# Patient Record
Sex: MALE | Race: WHITE | NOT HISPANIC OR LATINO | Employment: FULL TIME | URBAN - METROPOLITAN AREA
[De-identification: names, ages, dates, MRNs, and addresses within clinical notes are randomized per-mention and may not be internally consistent; named-entity substitution may affect disease eponyms.]

---

## 2020-07-01 ENCOUNTER — PREP FOR SURGERY (OUTPATIENT)
Dept: OTHER | Facility: HOSPITAL | Age: 50
End: 2020-07-01

## 2020-07-01 DIAGNOSIS — Z12.11 ENCOUNTER FOR SCREENING FOR MALIGNANT NEOPLASM OF COLON: Primary | ICD-10-CM

## 2020-07-01 RX ORDER — SODIUM CHLORIDE, SODIUM LACTATE, POTASSIUM CHLORIDE, CALCIUM CHLORIDE 600; 310; 30; 20 MG/100ML; MG/100ML; MG/100ML; MG/100ML
30 INJECTION, SOLUTION INTRAVENOUS CONTINUOUS
Status: CANCELLED | OUTPATIENT
Start: 2020-08-03

## 2020-07-06 PROBLEM — Z12.11 ENCOUNTER FOR SCREENING FOR MALIGNANT NEOPLASM OF COLON: Status: ACTIVE | Noted: 2020-07-06

## 2020-07-27 ENCOUNTER — TRANSCRIBE ORDERS (OUTPATIENT)
Dept: SLEEP MEDICINE | Facility: HOSPITAL | Age: 50
End: 2020-07-27

## 2020-07-27 DIAGNOSIS — Z01.818 OTHER SPECIFIED PRE-OPERATIVE EXAMINATION: Primary | ICD-10-CM

## 2020-07-31 ENCOUNTER — LAB (OUTPATIENT)
Dept: LAB | Facility: HOSPITAL | Age: 50
End: 2020-07-31

## 2020-07-31 DIAGNOSIS — Z01.818 OTHER SPECIFIED PRE-OPERATIVE EXAMINATION: ICD-10-CM

## 2020-07-31 PROCEDURE — U0004 COV-19 TEST NON-CDC HGH THRU: HCPCS

## 2020-07-31 PROCEDURE — C9803 HOPD COVID-19 SPEC COLLECT: HCPCS

## 2020-08-01 LAB
REF LAB TEST METHOD: NORMAL
SARS-COV-2 RNA RESP QL NAA+PROBE: NOT DETECTED

## 2020-08-03 ENCOUNTER — ANESTHESIA EVENT (OUTPATIENT)
Dept: GASTROENTEROLOGY | Facility: HOSPITAL | Age: 50
End: 2020-08-03

## 2020-08-03 ENCOUNTER — ANESTHESIA (OUTPATIENT)
Dept: GASTROENTEROLOGY | Facility: HOSPITAL | Age: 50
End: 2020-08-03

## 2020-09-10 ENCOUNTER — TRANSCRIBE ORDERS (OUTPATIENT)
Dept: ADMINISTRATIVE | Facility: HOSPITAL | Age: 50
End: 2020-09-10

## 2020-09-10 DIAGNOSIS — Z01.818 OTHER SPECIFIED PRE-OPERATIVE EXAMINATION: Primary | ICD-10-CM

## 2020-09-18 ENCOUNTER — LAB (OUTPATIENT)
Dept: LAB | Facility: HOSPITAL | Age: 50
End: 2020-09-18

## 2020-09-18 DIAGNOSIS — Z01.818 OTHER SPECIFIED PRE-OPERATIVE EXAMINATION: ICD-10-CM

## 2020-09-18 PROCEDURE — U0004 COV-19 TEST NON-CDC HGH THRU: HCPCS

## 2020-09-18 PROCEDURE — C9803 HOPD COVID-19 SPEC COLLECT: HCPCS

## 2020-09-19 LAB — SARS-COV-2 RNA RESP QL NAA+PROBE: NOT DETECTED

## 2020-09-21 ENCOUNTER — HOSPITAL ENCOUNTER (OUTPATIENT)
Facility: HOSPITAL | Age: 50
Setting detail: HOSPITAL OUTPATIENT SURGERY
Discharge: HOME | End: 2020-09-21
Attending: INTERNAL MEDICINE | Admitting: INTERNAL MEDICINE

## 2020-09-21 VITALS
DIASTOLIC BLOOD PRESSURE: 80 MMHG | TEMPERATURE: 97.9 F | BODY MASS INDEX: 23.68 KG/M2 | RESPIRATION RATE: 16 BRPM | HEIGHT: 71 IN | HEART RATE: 58 BPM | OXYGEN SATURATION: 98 % | SYSTOLIC BLOOD PRESSURE: 105 MMHG | WEIGHT: 169.13 LBS

## 2020-09-21 DIAGNOSIS — Z12.11 ENCOUNTER FOR SCREENING FOR MALIGNANT NEOPLASM OF COLON: ICD-10-CM

## 2020-09-21 PROCEDURE — 45385 COLONOSCOPY W/LESION REMOVAL: CPT | Performed by: INTERNAL MEDICINE

## 2020-09-21 PROCEDURE — 25010000002 PROPOFOL 10 MG/ML EMULSION: Performed by: NURSE ANESTHETIST, CERTIFIED REGISTERED

## 2020-09-21 PROCEDURE — 88305 TISSUE EXAM BY PATHOLOGIST: CPT | Performed by: INTERNAL MEDICINE

## 2020-09-21 RX ORDER — SODIUM CHLORIDE, SODIUM LACTATE, POTASSIUM CHLORIDE, CALCIUM CHLORIDE 600; 310; 30; 20 MG/100ML; MG/100ML; MG/100ML; MG/100ML
30 INJECTION, SOLUTION INTRAVENOUS CONTINUOUS
Status: DISCONTINUED | OUTPATIENT
Start: 2020-09-21 | End: 2020-09-21 | Stop reason: HOSPADM

## 2020-09-21 RX ORDER — PROPOFOL 10 MG/ML
VIAL (ML) INTRAVENOUS AS NEEDED
Status: DISCONTINUED | OUTPATIENT
Start: 2020-09-21 | End: 2020-09-21 | Stop reason: SURG

## 2020-09-21 RX ORDER — PAROXETINE HYDROCHLORIDE 20 MG/1
20 TABLET, FILM COATED ORAL EVERY MORNING
COMMUNITY

## 2020-09-21 RX ORDER — LIDOCAINE HYDROCHLORIDE 20 MG/ML
INJECTION, SOLUTION INFILTRATION; PERINEURAL AS NEEDED
Status: DISCONTINUED | OUTPATIENT
Start: 2020-09-21 | End: 2020-09-21 | Stop reason: SURG

## 2020-09-21 RX ORDER — PROPOFOL 10 MG/ML
VIAL (ML) INTRAVENOUS CONTINUOUS PRN
Status: DISCONTINUED | OUTPATIENT
Start: 2020-09-21 | End: 2020-09-21 | Stop reason: SURG

## 2020-09-21 RX ADMIN — SODIUM CHLORIDE, POTASSIUM CHLORIDE, SODIUM LACTATE AND CALCIUM CHLORIDE: 600; 310; 30; 20 INJECTION, SOLUTION INTRAVENOUS at 07:56

## 2020-09-21 RX ADMIN — LIDOCAINE HYDROCHLORIDE 60 MG: 20 INJECTION, SOLUTION INFILTRATION; PERINEURAL at 08:02

## 2020-09-21 RX ADMIN — PROPOFOL 100 MG: 10 INJECTION, EMULSION INTRAVENOUS at 08:04

## 2020-09-21 RX ADMIN — PROPOFOL 160 MCG/KG/MIN: 10 INJECTION, EMULSION INTRAVENOUS at 08:05

## 2020-09-21 NOTE — H&P
"[facility], [place], operated by [facility]  Pre Procedure History & Physical    Chief Complaint:   Time for my colonoscopy    Subjective     HPI:   50 y.o. male presenting for average risk screening.  No prior cscope.  No current GI issues.  No family hx of colon cancer or polyps.        Past Medical History:   Past Medical History:   Diagnosis Date   • Anxiety    • Depression        Family History:  History reviewed. No pertinent family history.    Social History:       Medications:   Medications Prior to Admission   Medication Sig Dispense Refill Last Dose   • PARoxetine (PAXIL) 20 MG tablet Take 20 mg by mouth Every Morning.          Allergies:  Penicillins    ROS:    Pertinent items are noted in HPI     Objective     Blood pressure 119/81, pulse 78, temperature 97.9 °F (36.6 °C), resp. rate 20, height 180.3 cm (71\"), weight 76.7 kg (169 lb 2 oz), SpO2 98 %.    Physical Exam   Constitutional: Pt is oriented to person, place, and time and well-developed, well-nourished, and in no distress.   HENT:   Mouth/Throat: Oropharynx is clear and moist.   Neck: Normal range of motion. Neck supple.   Cardiovascular: Normal rate, regular rhythm and normal heart sounds.    Pulmonary/Chest: Effort normal and breath sounds normal. No respiratory distress. No  wheezes.   Abdominal: Soft. Bowel sounds are normal.   Skin: Skin is warm and dry.   Psychiatric: Mood, memory, affect and judgment normal.     Assessment/Plan     Diagnosis:  Encounter for screening for colon cancer    Anticipated Surgical Procedure:  Colonoscopy    The risks, benefits, and alternatives of this procedure have been discussed with the patient or the responsible party- the patient understands and agrees to proceed.                                                                "

## 2020-09-21 NOTE — ANESTHESIA POSTPROCEDURE EVALUATION
Patient: Raymon Gonzalez    Procedure Summary     Date: 09/21/20 Room / Location: [facility] ENDOSCOPY 10 / [facility] ENDOSCOPY    Anesthesia Start: 0756 Anesthesia Stop: 0837    Procedure: COLONOSCOPY to cecum: cold snare and biopsy polypectomies (N/A ) Diagnosis:       Encounter for screening for malignant neoplasm of colon      (Encounter for screening for malignant neoplasm of colon [Z12.11])    Surgeon: Dennis Gomez MD Provider: Cas Lujan MD    Anesthesia Type: MAC ASA Status: 2          Anesthesia Type: MAC    Vitals  Vitals Value Taken Time   /69 09/21/20 0849   Temp     Pulse 64 09/21/20 0849   Resp 16 09/21/20 0849   SpO2 99 % 09/21/20 0849           Post Anesthesia Care and Evaluation    Patient location during evaluation: PHASE II  Patient participation: complete - patient participated  Level of consciousness: awake and alert  Pain management: adequate  Airway patency: patent  Anesthetic complications: No anesthetic complications  PONV Status: none  Cardiovascular status: acceptable  Respiratory status: acceptable  Hydration status: acceptable

## 2020-09-21 NOTE — ANESTHESIA PREPROCEDURE EVALUATION
" Anesthesia Evaluation     Patient summary reviewed and Nursing notes reviewed                Airway   Mallampati: III  TM distance: >3 FB  Neck ROM: full  Possible difficult intubation  Dental - normal exam     Pulmonary - normal exam   Cardiovascular - normal exam        Neuro/Psych  (+) psychiatric history Anxiety and Depression,     GI/Hepatic/Renal/Endo      Musculoskeletal     Abdominal  - normal exam   Substance History      OB/GYN          Other                      Anesthesia Plan    ASA 2     MAC   (Goes by \"Gilbert\")  intravenous induction     Anesthetic plan, all risks, benefits, and alternatives have been provided, discussed and informed consent has been obtained with: patient.    Plan discussed with CRNA.      "

## 2020-09-22 LAB
CYTO UR: NORMAL
LAB AP CASE REPORT: NORMAL
PATH REPORT.FINAL DX SPEC: NORMAL
PATH REPORT.GROSS SPEC: NORMAL

## 2020-10-19 ENCOUNTER — TELEPHONE (OUTPATIENT)
Dept: GASTROENTEROLOGY | Facility: CLINIC | Age: 50
End: 2020-10-19

## 2020-10-19 NOTE — TELEPHONE ENCOUNTER
Patient called back. Advised as per Dr. Gomez's note. He verb udnerstanding and is agreeable to the plan.

## 2020-10-19 NOTE — TELEPHONE ENCOUNTER
----- Message from Dennis Gomez MD sent at 9/28/2020 10:47 AM EDT -----  Adenomas and one benign polyp  Recall 3 years

## 2021-01-06 DIAGNOSIS — E23.0 HYPOGONADOTROPIC HYPOGONADISM (HCC): Primary | ICD-10-CM

## 2021-01-07 RX ORDER — TESTOSTERONE 20.25 MG/1.25G
GEL TOPICAL
Qty: 150 G | Refills: 5 | Status: SHIPPED | OUTPATIENT
Start: 2021-01-07 | End: 2021-08-10

## 2021-07-15 ENCOUNTER — APPOINTMENT (OUTPATIENT)
Dept: CT IMAGING | Facility: HOSPITAL | Age: 51
End: 2021-07-15

## 2021-07-15 ENCOUNTER — HOSPITAL ENCOUNTER (OUTPATIENT)
Facility: HOSPITAL | Age: 51
Setting detail: OBSERVATION
Discharge: HOME | End: 2021-07-17
Attending: EMERGENCY MEDICINE | Admitting: HOSPITALIST

## 2021-07-15 ENCOUNTER — APPOINTMENT (OUTPATIENT)
Dept: GENERAL RADIOLOGY | Facility: HOSPITAL | Age: 51
End: 2021-07-15

## 2021-07-15 DIAGNOSIS — R42 VERTIGO: Primary | ICD-10-CM

## 2021-07-15 LAB
ALBUMIN SERPL-MCNC: 4.2 G/DL (ref 3.5–5.2)
ALBUMIN/GLOB SERPL: 1.4 G/DL
ALP SERPL-CCNC: 74 U/L (ref 39–117)
ALT SERPL W P-5'-P-CCNC: 20 U/L (ref 1–41)
ANION GAP SERPL CALCULATED.3IONS-SCNC: 9.6 MMOL/L (ref 5–15)
AST SERPL-CCNC: 15 U/L (ref 1–40)
BASOPHILS # BLD AUTO: 0.06 10*3/MM3 (ref 0–0.2)
BASOPHILS NFR BLD AUTO: 0.8 % (ref 0–1.5)
BILIRUB SERPL-MCNC: 0.3 MG/DL (ref 0–1.2)
BUN SERPL-MCNC: 21 MG/DL (ref 6–20)
BUN/CREAT SERPL: 21.6 (ref 7–25)
CALCIUM SPEC-SCNC: 9.2 MG/DL (ref 8.6–10.5)
CHLORIDE SERPL-SCNC: 102 MMOL/L (ref 98–107)
CO2 SERPL-SCNC: 25.4 MMOL/L (ref 22–29)
CREAT SERPL-MCNC: 0.97 MG/DL (ref 0.76–1.27)
DEPRECATED RDW RBC AUTO: 42.7 FL (ref 37–54)
EOSINOPHIL # BLD AUTO: 0.08 10*3/MM3 (ref 0–0.4)
EOSINOPHIL NFR BLD AUTO: 1 % (ref 0.3–6.2)
ERYTHROCYTE [DISTWIDTH] IN BLOOD BY AUTOMATED COUNT: 13 % (ref 12.3–15.4)
GFR SERPL CREATININE-BSD FRML MDRD: 81 ML/MIN/1.73
GLOBULIN UR ELPH-MCNC: 3.1 GM/DL
GLUCOSE SERPL-MCNC: 88 MG/DL (ref 65–99)
HCT VFR BLD AUTO: 41.3 % (ref 37.5–51)
HGB BLD-MCNC: 13.9 G/DL (ref 13–17.7)
HOLD SPECIMEN: NORMAL
HOLD SPECIMEN: NORMAL
IMM GRANULOCYTES # BLD AUTO: 0.03 10*3/MM3 (ref 0–0.05)
IMM GRANULOCYTES NFR BLD AUTO: 0.4 % (ref 0–0.5)
LYMPHOCYTES # BLD AUTO: 2.17 10*3/MM3 (ref 0.7–3.1)
LYMPHOCYTES NFR BLD AUTO: 27.2 % (ref 19.6–45.3)
MAGNESIUM SERPL-MCNC: 2.4 MG/DL (ref 1.6–2.6)
MCH RBC QN AUTO: 30.3 PG (ref 26.6–33)
MCHC RBC AUTO-ENTMCNC: 33.7 G/DL (ref 31.5–35.7)
MCV RBC AUTO: 90.2 FL (ref 79–97)
MONOCYTES # BLD AUTO: 0.83 10*3/MM3 (ref 0.1–0.9)
MONOCYTES NFR BLD AUTO: 10.4 % (ref 5–12)
NEUTROPHILS NFR BLD AUTO: 4.82 10*3/MM3 (ref 1.7–7)
NEUTROPHILS NFR BLD AUTO: 60.2 % (ref 42.7–76)
NRBC BLD AUTO-RTO: 0 /100 WBC (ref 0–0.2)
PLATELET # BLD AUTO: 354 10*3/MM3 (ref 140–450)
PMV BLD AUTO: 8.5 FL (ref 6–12)
POTASSIUM SERPL-SCNC: 3.9 MMOL/L (ref 3.5–5.2)
PROT SERPL-MCNC: 7.3 G/DL (ref 6–8.5)
QT INTERVAL: 403 MS
RBC # BLD AUTO: 4.58 10*6/MM3 (ref 4.14–5.8)
SARS-COV-2 ORF1AB RESP QL NAA+PROBE: NOT DETECTED
SODIUM SERPL-SCNC: 137 MMOL/L (ref 136–145)
TROPONIN T SERPL-MCNC: <0.01 NG/ML (ref 0–0.03)
WBC # BLD AUTO: 7.99 10*3/MM3 (ref 3.4–10.8)
WHOLE BLOOD HOLD SPECIMEN: NORMAL

## 2021-07-15 PROCEDURE — 25010000002 LORAZEPAM PER 2 MG: Performed by: EMERGENCY MEDICINE

## 2021-07-15 PROCEDURE — G0378 HOSPITAL OBSERVATION PER HR: HCPCS

## 2021-07-15 PROCEDURE — 71045 X-RAY EXAM CHEST 1 VIEW: CPT

## 2021-07-15 PROCEDURE — 93005 ELECTROCARDIOGRAM TRACING: CPT | Performed by: EMERGENCY MEDICINE

## 2021-07-15 PROCEDURE — 80053 COMPREHEN METABOLIC PANEL: CPT | Performed by: EMERGENCY MEDICINE

## 2021-07-15 PROCEDURE — 85025 COMPLETE CBC W/AUTO DIFF WBC: CPT | Performed by: EMERGENCY MEDICINE

## 2021-07-15 PROCEDURE — 96374 THER/PROPH/DIAG INJ IV PUSH: CPT

## 2021-07-15 PROCEDURE — C9803 HOPD COVID-19 SPEC COLLECT: HCPCS

## 2021-07-15 PROCEDURE — 83735 ASSAY OF MAGNESIUM: CPT | Performed by: EMERGENCY MEDICINE

## 2021-07-15 PROCEDURE — 99284 EMERGENCY DEPT VISIT MOD MDM: CPT

## 2021-07-15 PROCEDURE — 93010 ELECTROCARDIOGRAM REPORT: CPT | Performed by: INTERNAL MEDICINE

## 2021-07-15 PROCEDURE — 84484 ASSAY OF TROPONIN QUANT: CPT | Performed by: EMERGENCY MEDICINE

## 2021-07-15 PROCEDURE — U0004 COV-19 TEST NON-CDC HGH THRU: HCPCS | Performed by: EMERGENCY MEDICINE

## 2021-07-15 PROCEDURE — 70450 CT HEAD/BRAIN W/O DYE: CPT

## 2021-07-15 RX ORDER — SODIUM CHLORIDE 0.9 % (FLUSH) 0.9 %
10 SYRINGE (ML) INJECTION EVERY 12 HOURS SCHEDULED
Status: DISCONTINUED | OUTPATIENT
Start: 2021-07-15 | End: 2021-07-17 | Stop reason: HOSPADM

## 2021-07-15 RX ORDER — ASPIRIN 81 MG/1
81 TABLET, CHEWABLE ORAL DAILY
Status: DISCONTINUED | OUTPATIENT
Start: 2021-07-16 | End: 2021-07-17 | Stop reason: HOSPADM

## 2021-07-15 RX ORDER — MECLIZINE HYDROCHLORIDE 25 MG/1
25 TABLET ORAL ONCE
Status: COMPLETED | OUTPATIENT
Start: 2021-07-15 | End: 2021-07-15

## 2021-07-15 RX ORDER — PAROXETINE HYDROCHLORIDE 20 MG/1
20 TABLET, FILM COATED ORAL EVERY MORNING
Status: DISCONTINUED | OUTPATIENT
Start: 2021-07-16 | End: 2021-07-17 | Stop reason: HOSPADM

## 2021-07-15 RX ORDER — MECLIZINE HYDROCHLORIDE 25 MG/1
25 TABLET ORAL EVERY 8 HOURS SCHEDULED
Status: DISCONTINUED | OUTPATIENT
Start: 2021-07-15 | End: 2021-07-17 | Stop reason: HOSPADM

## 2021-07-15 RX ORDER — BUPROPION HYDROCHLORIDE 150 MG/1
150 TABLET ORAL DAILY
Status: DISCONTINUED | OUTPATIENT
Start: 2021-07-16 | End: 2021-07-17 | Stop reason: HOSPADM

## 2021-07-15 RX ORDER — SODIUM CHLORIDE 0.9 % (FLUSH) 0.9 %
10 SYRINGE (ML) INJECTION AS NEEDED
Status: DISCONTINUED | OUTPATIENT
Start: 2021-07-15 | End: 2021-07-17 | Stop reason: HOSPADM

## 2021-07-15 RX ORDER — ONDANSETRON 2 MG/ML
4 INJECTION INTRAMUSCULAR; INTRAVENOUS EVERY 6 HOURS PRN
Status: DISCONTINUED | OUTPATIENT
Start: 2021-07-15 | End: 2021-07-17 | Stop reason: HOSPADM

## 2021-07-15 RX ORDER — LORAZEPAM 2 MG/ML
1 INJECTION INTRAMUSCULAR ONCE
Status: COMPLETED | OUTPATIENT
Start: 2021-07-15 | End: 2021-07-15

## 2021-07-15 RX ORDER — ATORVASTATIN CALCIUM 80 MG/1
80 TABLET, FILM COATED ORAL NIGHTLY
Status: DISCONTINUED | OUTPATIENT
Start: 2021-07-15 | End: 2021-07-17 | Stop reason: HOSPADM

## 2021-07-15 RX ORDER — SODIUM CHLORIDE 9 MG/ML
75 INJECTION, SOLUTION INTRAVENOUS CONTINUOUS
Status: DISCONTINUED | OUTPATIENT
Start: 2021-07-15 | End: 2021-07-17 | Stop reason: HOSPADM

## 2021-07-15 RX ORDER — BUPROPION HYDROCHLORIDE 150 MG/1
150 TABLET ORAL DAILY
COMMUNITY
Start: 2021-01-24 | End: 2022-08-23

## 2021-07-15 RX ORDER — ASPIRIN 300 MG/1
300 SUPPOSITORY RECTAL DAILY
Status: DISCONTINUED | OUTPATIENT
Start: 2021-07-16 | End: 2021-07-17 | Stop reason: HOSPADM

## 2021-07-15 RX ADMIN — LORAZEPAM 1 MG: 2 INJECTION INTRAMUSCULAR; INTRAVENOUS at 18:41

## 2021-07-15 RX ADMIN — MECLIZINE HYDROCHLORIDE 25 MG: 25 TABLET ORAL at 17:08

## 2021-07-15 NOTE — ED TRIAGE NOTES
Pt complains of dizziness.  Onset, middle of the night when he got up to go to the bathroom.   Pt also complains of nausea.  Mask placed on patient in triage.  Triage RN wearing mask throughout encounter.    
no

## 2021-07-15 NOTE — H&P
Internal medicine history and physical  INTERNAL MEDICINE   [facility]       Patient Identification:  Name: Raymon Gonzalez  Age: 51 y.o.  Sex: male  :  1970  MRN: [id number]                   Primary Care Physician: Chris Meeks MD                               Date of admission:7/15/2021    Chief Complaint: Sudden onset of sensation of room spinning earlier this morning.    History of Present Illness:   Patient is a 51-year-old male with past medical history remarkable for anxiety depression and is on current regimen for a long period of time he does not recall any recent changes, no recent history of sinus congestion ear infection or upper respiratory tract infection and was in his usual state of health when he went to bed last night.  He woke up in the middle of the night/early morning to go to the bathroom as per his routine and suddenly felt that anything is spinning around him.  He was unable to ambulate and had to crawl to the bathroom to urinate.  Symptoms gradually improved but come back any recent head and extended his head and developed similar symptoms.  He does not have any associated blurry vision or focal weakness of arm or legs.  When he keeps his head still he feels better.  Because of that and related emergency room and after discussing his case with neurology service on-call it was decided that patient would benefit from hospitalization and further work-up including neurology evaluation.  Patient was given a dose of Klonopin along with Antivert in the emergency room.  At present patient feels somewhat better.      Past Medical History:  Past Medical History:   Diagnosis Date   • Anxiety    • Depression      Past Surgical History:  Past Surgical History:   Procedure Laterality Date   • COLONOSCOPY N/A 2020    Procedure: COLONOSCOPY to cecum: cold snare and biopsy polypectomies;  Surgeon: Dennis Gomez MD;  Location: [facility] ENDOSCOPY;  Service:  "Gastroenterology;  Laterality: N/A;  screening  post:  polyps,   • KNEE ARTHROSCOPY W/ ACL RECONSTRUCTION Right 2004      Home Meds:  (Not in a hospital admission)    Current Meds:     Current Facility-Administered Medications:   •  sodium chloride 0.9 % flush 10 mL, 10 mL, Intravenous, PRN, Jayce Nolasco MD    Current Outpatient Medications:   •  buPROPion XL (WELLBUTRIN XL) 150 MG 24 hr tablet, Take 150 mg by mouth Daily., Disp: , Rfl:   •  Testosterone 20.25 MG/1.25GM (1.62%) gel, 3 pumps to skin once a day- DISPENSE ANDROGEL (GENERIC) PUMP, Disp: 150 g, Rfl: 5  •  PARoxetine (PAXIL) 20 MG tablet, Take 20 mg by mouth Every Morning., Disp: , Rfl:   Allergies:  Allergies   Allergen Reactions   • Penicillins Rash     Social History:   Social History     Tobacco Use   • Smoking status: Never Smoker   Substance Use Topics   • Alcohol use: Not Currently      Family History:  History reviewed. No pertinent family history.       Review of Systems  See history of present illness and past medical history.    Constitutional: Remarkable for no fever or chills  Cardiovascular: Remarkable for no chest pain or shortness of breath  GI: Remarkable for no nausea vomiting or diarrhea  : Remarkable for no burning urination frequency urgency  Musculoskeletal: Remarkable for no new joint aches and pains  Neurological: Remarkable for what has been described in the history of presenting illness  Remainder of ROS is negative.      Vitals:   /84 (BP Location: Right arm)   Pulse 79   Temp 97.1 °F (36.2 °C)   Resp 16   Ht 180.3 cm (71\")   Wt 74.8 kg (165 lb)   SpO2 99%   BMI 23.01 kg/m²   I/O: No intake or output data in the 24 hours ending 07/15/21 1927  Exam:  Patient is examined using the personal protective equipment as per guidelines from infection control for this particular patient as enacted.  Hand washing was performed before and after patient interaction.  General Appearance:    Alert, cooperative, no distress, " appears stated age   Head:    Normocephalic, without obvious abnormality, atraumatic   Eyes:    PERRL, conjunctiva/corneas clear, EOM's intact, both eyes   Ears:    Normal external ear canals, both ears   Nose:   Nares normal, septum midline, mucosa normal, no drainage    or sinus tenderness   Throat:   Lips, tongue, gums normal; oral mucosa pink and moist   Neck:   Supple, symmetrical, trachea midline, no adenopathy;     thyroid:  no enlargement/tenderness/nodules; no carotid    bruit or JVD   Back:     Symmetric, no curvature, ROM normal, no CVA tenderness   Lungs:     Clear to auscultation bilaterally, respirations unlabored   Chest Wall:    No tenderness or deformity    Heart:    Regular rate and rhythm, S1 and S2 normal, no murmur, rub   or gallop   Abdomen:     Soft, non-tender, bowel sounds active all four quadrants,     no masses, no hepatomegaly, no splenomegaly   Extremities:   Extremities normal, atraumatic, no cyanosis or edema   Pulses:   Pulses palpable in all extremities; symmetric all extremities   Skin:   Skin color normal, Skin is warm and dry,  no rashes or palpable lesions   Neurologic:   CNII-XII intact, motor strength grossly intact, sensation grossly intact to light touch, no focal deficits noted       Data Review:      I reviewed the patient's new clinical results.  Results from last 7 days   Lab Units 07/15/21  1642   WBC 10*3/mm3 7.99   HEMOGLOBIN g/dL 13.9   PLATELETS 10*3/mm3 354     Results from last 7 days   Lab Units 07/15/21  1642   SODIUM mmol/L 137   POTASSIUM mmol/L 3.9   CHLORIDE mmol/L 102   CO2 mmol/L 25.4   BUN mg/dL 21*   CREATININE mg/dL 0.97   CALCIUM mg/dL 9.2   GLUCOSE mg/dL 88     CT Head Without Contrast    Result Date: 7/15/2021  No acute intracranial hemorrhage. Minimal small vessel ischemic disease. If there is clinical concern for acute infarction, this would be better assessed with MRI.  Discussed with Dr. Nolasco at 6:20 PM.  This report was finalized on 7/15/2021  6:21 PM by Dr. Radha Michaud M.D.      XR Chest 1 View    Result Date: 7/15/2021  No evidence for acute pulmonary process. Follow-up as clinical indications persist.  This report was finalized on 7/15/2021 4:54 PM by Dr. Sushant Hodges M.D.      ECG 12 Lead   Final Result   HEART RATE= 61  bpm   RR Interval= 984  ms   FL Interval= 164  ms   P Horizontal Axis= -13  deg   P Front Axis= 72  deg   QRSD Interval= 103  ms   QT Interval= 403  ms   QRS Axis= 74  deg   T Wave Axis= 28  deg   - ABNORMAL ECG -   Sinus rhythm   Probable left ventricular hypertrophy   NO PRIOR TRACING AVAILABLE FOR COMPARISON   Electronically Signed By: Jay Garcia ([facility]) 15-Jul-2021 17:01:07   Date and Time of Study: 2021-07-15 16:26:45        Microbiology Results (last 10 days)     Procedure Component Value - Date/Time    COVID PRE-OP / PRE-PROCEDURE SCREENING ORDER (NO ISOLATION) - Swab, Nasopharynx [[id number]]  (Normal) Collected: 07/15/21 1921    Lab Status: Final result Specimen: Swab from Nasopharynx Updated: 07/15/21 2324    Narrative:      The following orders were created for panel order COVID PRE-OP / PRE-PROCEDURE SCREENING ORDER (NO ISOLATION) - Swab, Nasopharynx.  Procedure                               Abnormality         Status                     ---------                               -----------         ------                     COVID-19,APTIMA PANTHER,...[[id number]]  Normal              Final result                 Please view results for these tests on the individual orders.    COVID-19,APTIMA PANTHER,[facility], NP/OP SWAB IN UTM/VTM/SALINE TRANSPORT MEDIA,24 HR TAT - Swab, Nasopharynx [[id number]]  (Normal) Collected: 07/15/21 1921    Lab Status: Final result Specimen: Swab from Nasopharynx Updated: 07/15/21 2324     COVID19 Not Detected    Narrative:      Fact sheet for providers: https://www.fda.gov/media/905352/download     Fact sheet for patients: https://www.fda.gov/media/423334/download    Test performed  by RT PCR.          Assessment:  Active Hospital Problems    Diagnosis  POA   • **Vertigo [R42]  Yes   • Depression [F32.9]  Unknown   • Anxiety [F41.9]  Unknown       Medical decision making:  Acute vertigo-likely benign positional and could very well be due to vestibular neuronitis, rule out posterior circulation TIA/CVA.  Plan is to continue with aspirin and statin, neurochecks and MRI and MRA of head and neck vessels and neurology consultation as per report conversation by ER physician with neurology service on-call.  See stroke admission order set.  Continue symptomatic management of vertigo as needed Antivert.  Depression and anxiety-continue his current regimen and monitor.    Kristen Brooks MD   7/15/2021  19:27 EDT  Much of this encounter note is an electronic transcription/translation of spoken language to printed text. The electronic translation of spoken language may permit erroneous, or at times, nonsensical words or phrases to be inadvertently transcribed; Although I have reviewed the note for such errors, some may still exist

## 2021-07-15 NOTE — ED PROVIDER NOTES
EMERGENCY DEPARTMENT ENCOUNTER    Room Number:  22/22  Date of encounter:  7/15/2021  PCP: Chris Meeks MD  Patient Care Team:  Chris Meeks MD as PCP - General (Family Medicine)   Historian: Patient    HPI:  Chief Complaint: Dizziness  A complete HPI/ROS/PMH/PSH/SH/FH are unobtainable due to: Nothing    Context: Raymon Gonzalez is a 51 y.o. male who presents to the ED c/o dizziness.  He reports that in the middle the night he had sudden onset room spinning dizziness.  He states that he got nauseous with it.  He had to crawl to the bathroom to urinate.  He states over the course of the morning his symptoms improved but then he extended his neck and his symptoms returned today.  He denies any vision changes.  He denies weakness or numbness.  He has never had similar episodes in the past.  He has no medical risk factors for stroke.  Moving his head makes it worse.  Immobilizing his head makes it better.  He has not taken any medicine for his symptoms.    Prior record review: GI visit 9/21/2020 for colonoscopy    PAST MEDICAL HISTORY  Active Ambulatory Problems     Diagnosis Date Noted   • Encounter for screening for malignant neoplasm of colon 07/06/2020     Resolved Ambulatory Problems     Diagnosis Date Noted   • No Resolved Ambulatory Problems     Past Medical History:   Diagnosis Date   • Anxiety    • Depression          PAST SURGICAL HISTORY  Past Surgical History:   Procedure Laterality Date   • COLONOSCOPY N/A 9/21/2020    Procedure: COLONOSCOPY to cecum: cold snare and biopsy polypectomies;  Surgeon: Dennis Gomez MD;  Location: [facility] ENDOSCOPY;  Service: Gastroenterology;  Laterality: N/A;  screening  post:  polyps,   • KNEE ARTHROSCOPY W/ ACL RECONSTRUCTION Right 2004         FAMILY HISTORY  History reviewed. No pertinent family history.      SOCIAL HISTORY  Social History     Socioeconomic History   • Marital status:      Spouse name: Not on file   • Number of  children: Not on file   • Years of education: Not on file   • Highest education level: Not on file   Tobacco Use   • Smoking status: Never Smoker   Substance and Sexual Activity   • Alcohol use: Not Currently   • Drug use: Never   • Sexual activity: Defer         ALLERGIES  Penicillins        REVIEW OF SYSTEMS  Review of Systems   No chest pain, no shortness of breath, no abdominal pain, positive nausea, no vomiting, no weakness, no numbness, no diplopia  All systems reviewed and negative except for those discussed in HPI.       PHYSICAL EXAM    I have reviewed the triage vital signs and nursing notes.    ED Triage Vitals   Temp Heart Rate Resp BP SpO2   07/15/21 1518 07/15/21 1518 07/15/21 1518 07/15/21 1642 07/15/21 1518   97.1 °F (36.2 °C) 79 16 128/84 99 %      Temp src Heart Rate Source Patient Position BP Location FiO2 (%)   -- -- -- 07/15/21 1642 --      Right arm        Physical Exam  GENERAL: Awake, alert, no acute distress  SKIN: Warm, dry  HENT: Normocephalic, atraumatic  EYES: no scleral icterus  CV: regular rhythm, regular rate  RESPIRATORY: normal effort, lungs clear  ABDOMEN: soft, nontender, nondistended  MUSCULOSKELETAL: no deformity  NEURO: alert, moves all extremities, follows commands. No nystagmus    Interval: baseline  1a. Level of Consciousness: 0-->Alert, keenly responsive  1b. LOC Questions: 0-->Answers both questions correctly  1c. LOC Commands: 0-->Performs both tasks correctly  2. Best Gaze: 0-->Normal  3. Visual: 0-->No visual loss  4. Facial Palsy: 0-->Normal symmetrical movements  5a. Motor Arm, Left: 0-->No drift, limb holds 90 (or 45) degrees for full 10 secs  5b. Motor Arm, Right: 0-->No drift, limb holds 90 (or 45) degrees for full 10 secs  6a. Motor Leg, Left: 0-->No drift, leg holds 30 degree position for full 5 secs  6b. Motor Leg, Right: 0-->No drift, leg holds 30 degree position for full 5 secs  7. Limb Ataxia: 0-->Absent  8. Sensory: 0-->Normal, no sensory loss  9. Best  Language: 0-->No aphasia, normal  10. Dysarthria: 0-->Normal  11. Extinction and Inattention (formerly Neglect): 0-->No abnormality    Total (NIH Stroke Scale): 0        LAB RESULTS  Recent Results (from the past 24 hour(s))   ECG 12 Lead    Collection Time: 07/15/21  4:26 PM   Result Value Ref Range    QT Interval 403 ms   Comprehensive Metabolic Panel    Collection Time: 07/15/21  4:42 PM    Specimen: Blood   Result Value Ref Range    Glucose 88 65 - 99 mg/dL    BUN 21 (H) 6 - 20 mg/dL    Creatinine 0.97 0.76 - 1.27 mg/dL    Sodium 137 136 - 145 mmol/L    Potassium 3.9 3.5 - 5.2 mmol/L    Chloride 102 98 - 107 mmol/L    CO2 25.4 22.0 - 29.0 mmol/L    Calcium 9.2 8.6 - 10.5 mg/dL    Total Protein 7.3 6.0 - 8.5 g/dL    Albumin 4.20 3.50 - 5.20 g/dL    ALT (SGPT) 20 1 - 41 U/L    AST (SGOT) 15 1 - 40 U/L    Alkaline Phosphatase 74 39 - 117 U/L    Total Bilirubin 0.3 0.0 - 1.2 mg/dL    eGFR Non African Amer 81 >60 mL/min/1.73    Globulin 3.1 gm/dL    A/G Ratio 1.4 g/dL    BUN/Creatinine Ratio 21.6 7.0 - 25.0    Anion Gap 9.6 5.0 - 15.0 mmol/L   Troponin    Collection Time: 07/15/21  4:42 PM    Specimen: Blood   Result Value Ref Range    Troponin T <0.010 0.000 - 0.030 ng/mL   Magnesium    Collection Time: 07/15/21  4:42 PM    Specimen: Blood   Result Value Ref Range    Magnesium 2.4 1.6 - 2.6 mg/dL   Green Top (Gel)    Collection Time: 07/15/21  4:42 PM   Result Value Ref Range    Extra Tube Hold for add-ons.    Lavender Top    Collection Time: 07/15/21  4:42 PM   Result Value Ref Range    Extra Tube hold for add-on    Gold Top - SST    Collection Time: 07/15/21  4:42 PM   Result Value Ref Range    Extra Tube Hold for add-ons.    CBC Auto Differential    Collection Time: 07/15/21  4:42 PM    Specimen: Blood   Result Value Ref Range    WBC 7.99 3.40 - 10.80 10*3/mm3    RBC 4.58 4.14 - 5.80 10*6/mm3    Hemoglobin 13.9 13.0 - 17.7 g/dL    Hematocrit 41.3 37.5 - 51.0 %    MCV 90.2 79.0 - 97.0 fL    MCH 30.3 26.6 - 33.0 pg     MCHC 33.7 31.5 - 35.7 g/dL    RDW 13.0 12.3 - 15.4 %    RDW-SD 42.7 37.0 - 54.0 fl    MPV 8.5 6.0 - 12.0 fL    Platelets 354 140 - 450 10*3/mm3    Neutrophil % 60.2 42.7 - 76.0 %    Lymphocyte % 27.2 19.6 - 45.3 %    Monocyte % 10.4 5.0 - 12.0 %    Eosinophil % 1.0 0.3 - 6.2 %    Basophil % 0.8 0.0 - 1.5 %    Immature Grans % 0.4 0.0 - 0.5 %    Neutrophils, Absolute 4.82 1.70 - 7.00 10*3/mm3    Lymphocytes, Absolute 2.17 0.70 - 3.10 10*3/mm3    Monocytes, Absolute 0.83 0.10 - 0.90 10*3/mm3    Eosinophils, Absolute 0.08 0.00 - 0.40 10*3/mm3    Basophils, Absolute 0.06 0.00 - 0.20 10*3/mm3    Immature Grans, Absolute 0.03 0.00 - 0.05 10*3/mm3    nRBC 0.0 0.0 - 0.2 /100 WBC       Ordered the above labs and independently reviewed the results.        RADIOLOGY  CT Head Without Contrast    Result Date: 7/15/2021  EXAM:  CT HEAD WO CONTRAST-  HISTORY:  Dizziness, headache.  COMPARISON:  None.  TECHNIQUE: Noncontrast images of the brain were obtained. Reformatted images were reviewed. Radiation dose reduction techniques were utilized, including automated exposure control and exposure modulation based on body size.  FINDINGS:   The ventricles and sulci are within normal limits.  No acute intracranial hemorrhage or pathologic extra-axial collection is identified.  There is no midline shift or mass effect.  The basal cisterns are patent. A few scattered foci of subcortical hypoattenuation are nonspecific but may reflect minimal small vessel ischemic disease. The grey-white matter differentiation is maintained.       No acute intracranial hemorrhage. Minimal small vessel ischemic disease. If there is clinical concern for acute infarction, this would be better assessed with MRI.  Discussed with Dr. Nolasco at 6:20 PM.  This report was finalized on 7/15/2021 6:21 PM by Dr. Radha Michaud M.D.      XR Chest 1 View    Result Date: 7/15/2021  XR CHEST 1 VW-  HISTORY: Male who is 51 years-old,  weakness  TECHNIQUE: Frontal view of  the chest  COMPARISON: None available  FINDINGS: Heart, mediastinum and pulmonary vasculature are unremarkable. No focal pulmonary consolidation, pleural effusion, or pneumothorax. No acute osseous process.      No evidence for acute pulmonary process. Follow-up as clinical indications persist.  This report was finalized on 7/15/2021 4:54 PM by Dr. Sushant Hodges M.D.        I ordered the above noted radiological studies. Reviewed by me and discussed with radiologist.  See dictation for official radiology interpretation.      PROCEDURES    Procedures      MEDICATIONS GIVEN IN ER    Medications   sodium chloride 0.9 % flush 10 mL (has no administration in time range)   meclizine (ANTIVERT) tablet 25 mg (25 mg Oral Given 7/15/21 1708)   LORazepam (ATIVAN) injection 1 mg (1 mg Intravenous Given 7/15/21 1841)         PROGRESS, DATA ANALYSIS, CONSULTS, AND MEDICAL DECISION MAKING    All labs have been independently reviewed by me.  All radiology studies have been reviewed by me and discussed with radiologist dictating the report.   EKG's independently viewed and interpreted by me.  Discussion below represents my analysis of pertinent findings related to patient's condition, differential diagnosis, treatment plan and final disposition.    Differential diagnosis includes but is not limited to stroke, benign positional vertigo, arrhythmia, cranial hemorrhage.    ED Course as of Jul 15 1900   Thu Jul 15, 2021   1642 Beaking with Dr. Sanches with stroke.  Plan CT head, meclizine.  If improved, may go home without further imaging or work-up.    [TR]   1810 EKG          EKG time: Sixteen twenty-six  Rhythm/Rate: Normal sinus, rate sixty-one  P waves and MA: Normal P, normal MA  QRS, axis: Narrow QRS, normal axis  ST and T waves: LVH with no acute changes.    Interpreted Contemporaneously by me, independently viewed  No prior EKG available for comparison      [TR]   1821 Speaking with radiologist by phone.  No acute  findings.    [TR]   1824 The patient reports no improvement with meclizine.  I reviewed work-up findings with the patient.    [TR]   1826 Speaking with Dr. Weaver.  If no improvement with IV Ativan, plan admission.    [TR]   1829 Reviewed plan with the patient.  He is agreeable.    [TR]   1851 No improvement with Ativan.  Plan admission per neurology recommendations.    [TR]   1858 Speaking with Dr. Brooks.  Will admit.    [TR]      ED Course User Index  [TR] Jayce Nolasco MD           PPE: Both the patient and I wore a surgical mask throughout the entire patient encounter. I wore protective goggles.       AS OF 19:00 EDT VITALS:    BP - 128/84  HR - 79  TEMP - 97.1 °F (36.2 °C)  O2 SATS - 99%        DIAGNOSIS  Final diagnoses:   Vertigo         DISPOSITION  ED Disposition     ED Disposition Condition Comment    Decision to Admit  Level of Care: Med/Surg [1]   Diagnosis: Vertigo [[id number]]   Admitting Physician: ADDISON BROOKS [[id number]]   Attending Physician: ADDISON BROOKS [[id number]]                  Jayce Nolasco MD  07/15/21 1900

## 2021-07-16 ENCOUNTER — APPOINTMENT (OUTPATIENT)
Dept: CT IMAGING | Facility: HOSPITAL | Age: 51
End: 2021-07-16

## 2021-07-16 ENCOUNTER — APPOINTMENT (OUTPATIENT)
Dept: MRI IMAGING | Facility: HOSPITAL | Age: 51
End: 2021-07-16

## 2021-07-16 ENCOUNTER — APPOINTMENT (OUTPATIENT)
Dept: CARDIOLOGY | Facility: HOSPITAL | Age: 51
End: 2021-07-16

## 2021-07-16 LAB
ALBUMIN SERPL-MCNC: 4 G/DL (ref 3.5–5.2)
ALBUMIN/GLOB SERPL: 1.3 G/DL
ALP SERPL-CCNC: 76 U/L (ref 39–117)
ALT SERPL W P-5'-P-CCNC: 19 U/L (ref 1–41)
ANION GAP SERPL CALCULATED.3IONS-SCNC: 10.9 MMOL/L (ref 5–15)
AORTIC DIMENSIONLESS INDEX: 0.8 (DI)
AST SERPL-CCNC: 18 U/L (ref 1–40)
BH CV ECHO MEAS - ACS: 1.8 CM
BH CV ECHO MEAS - AO MAX PG (FULL): 0.55 MMHG
BH CV ECHO MEAS - AO MAX PG: 3.8 MMHG
BH CV ECHO MEAS - AO MEAN PG (FULL): 1 MMHG
BH CV ECHO MEAS - AO MEAN PG: 2 MMHG
BH CV ECHO MEAS - AO ROOT AREA (BSA CORRECTED): 1.7
BH CV ECHO MEAS - AO ROOT AREA: 9.1 CM^2
BH CV ECHO MEAS - AO ROOT DIAM: 3.4 CM
BH CV ECHO MEAS - AO V2 MAX: 97.4 CM/SEC
BH CV ECHO MEAS - AO V2 MEAN: 67.9 CM/SEC
BH CV ECHO MEAS - AO V2 VTI: 19.2 CM
BH CV ECHO MEAS - AVA(I,A): 3.8 CM^2
BH CV ECHO MEAS - AVA(I,D): 3.8 CM^2
BH CV ECHO MEAS - AVA(V,A): 4.2 CM^2
BH CV ECHO MEAS - AVA(V,D): 4.2 CM^2
BH CV ECHO MEAS - BSA(HAYCOCK): 1.9 M^2
BH CV ECHO MEAS - BSA: 1.9 M^2
BH CV ECHO MEAS - BZI_BMI: 23 KILOGRAMS/M^2
BH CV ECHO MEAS - BZI_METRIC_HEIGHT: 180.3 CM
BH CV ECHO MEAS - BZI_METRIC_WEIGHT: 74.8 KG
BH CV ECHO MEAS - CONTRAST EF 4CH: 58 CM2
BH CV ECHO MEAS - EDV(CUBED): 103.8 ML
BH CV ECHO MEAS - EDV(MOD-SP2): 124 ML
BH CV ECHO MEAS - EDV(MOD-SP4): 89 ML
BH CV ECHO MEAS - EDV(TEICH): 102.4 ML
BH CV ECHO MEAS - EF(CUBED): 68.4 %
BH CV ECHO MEAS - EF(MOD-BP): 58.4 %
BH CV ECHO MEAS - EF(MOD-SP2): 60.5 %
BH CV ECHO MEAS - EF(MOD-SP4): 55.1 %
BH CV ECHO MEAS - EF(TEICH): 60 %
BH CV ECHO MEAS - ESV(CUBED): 32.8 ML
BH CV ECHO MEAS - ESV(MOD-SP2): 49 ML
BH CV ECHO MEAS - ESV(MOD-SP4): 40 ML
BH CV ECHO MEAS - ESV(TEICH): 41 ML
BH CV ECHO MEAS - FS: 31.9 %
BH CV ECHO MEAS - IVS/LVPW: 1.3
BH CV ECHO MEAS - IVSD: 1 CM
BH CV ECHO MEAS - LAT PEAK E' VEL: 16.2 CM/SEC
BH CV ECHO MEAS - LV DIASTOLIC VOL/BSA (35-75): 45.8 ML/M^2
BH CV ECHO MEAS - LV MASS(C)D: 142.7 GRAMS
BH CV ECHO MEAS - LV MASS(C)DI: 73.4 GRAMS/M^2
BH CV ECHO MEAS - LV MAX PG: 3.2 MMHG
BH CV ECHO MEAS - LV MEAN PG: 1 MMHG
BH CV ECHO MEAS - LV SYSTOLIC VOL/BSA (12-30): 20.6 ML/M^2
BH CV ECHO MEAS - LV V1 MAX: 90.1 CM/SEC
BH CV ECHO MEAS - LV V1 MEAN: 54.2 CM/SEC
BH CV ECHO MEAS - LV V1 VTI: 16.2 CM
BH CV ECHO MEAS - LVIDD: 4.7 CM
BH CV ECHO MEAS - LVIDS: 3.2 CM
BH CV ECHO MEAS - LVLD AP2: 7.7 CM
BH CV ECHO MEAS - LVLD AP4: 7.3 CM
BH CV ECHO MEAS - LVLS AP2: 5.9 CM
BH CV ECHO MEAS - LVLS AP4: 5.6 CM
BH CV ECHO MEAS - LVOT AREA (M): 4.5 CM^2
BH CV ECHO MEAS - LVOT AREA: 4.5 CM^2
BH CV ECHO MEAS - LVOT DIAM: 2.4 CM
BH CV ECHO MEAS - LVPWD: 0.8 CM
BH CV ECHO MEAS - MED PEAK E' VEL: 8.4 CM/SEC
BH CV ECHO MEAS - MV A DUR: 0.12 SEC
BH CV ECHO MEAS - MV A MAX VEL: 43.3 CM/SEC
BH CV ECHO MEAS - MV DEC SLOPE: 193 CM/SEC^2
BH CV ECHO MEAS - MV DEC TIME: 230 SEC
BH CV ECHO MEAS - MV E MAX VEL: 63.8 CM/SEC
BH CV ECHO MEAS - MV E/A: 1.5
BH CV ECHO MEAS - MV MAX PG: 1.6 MMHG
BH CV ECHO MEAS - MV MEAN PG: 1 MMHG
BH CV ECHO MEAS - MV P1/2T MAX VEL: 63.6 CM/SEC
BH CV ECHO MEAS - MV P1/2T: 96.5 MSEC
BH CV ECHO MEAS - MV V2 MAX: 63.8 CM/SEC
BH CV ECHO MEAS - MV V2 MEAN: 40.2 CM/SEC
BH CV ECHO MEAS - MV V2 VTI: 22.1 CM
BH CV ECHO MEAS - MVA P1/2T LCG: 3.5 CM^2
BH CV ECHO MEAS - MVA(P1/2T): 2.3 CM^2
BH CV ECHO MEAS - MVA(VTI): 3.3 CM^2
BH CV ECHO MEAS - PA ACC TIME: 0.11 SEC
BH CV ECHO MEAS - PA MAX PG (FULL): 0.93 MMHG
BH CV ECHO MEAS - PA MAX PG: 2.3 MMHG
BH CV ECHO MEAS - PA PR(ACCEL): 29.1 MMHG
BH CV ECHO MEAS - PA V2 MAX: 76 CM/SEC
BH CV ECHO MEAS - PULM A REVS DUR: 0.12 SEC
BH CV ECHO MEAS - PULM A REVS VEL: 45 CM/SEC
BH CV ECHO MEAS - PULM DIAS VEL: 44.6 CM/SEC
BH CV ECHO MEAS - PULM S/D: 0.83
BH CV ECHO MEAS - PULM SYS VEL: 36.8 CM/SEC
BH CV ECHO MEAS - RAP SYSTOLE: 3 MMHG
BH CV ECHO MEAS - RV MAX PG: 1.4 MMHG
BH CV ECHO MEAS - RV MEAN PG: 1 MMHG
BH CV ECHO MEAS - RV V1 MAX: 58.7 CM/SEC
BH CV ECHO MEAS - RV V1 MEAN: 41.1 CM/SEC
BH CV ECHO MEAS - RV V1 VTI: 12.2 CM
BH CV ECHO MEAS - SI(AO): 89.7 ML/M^2
BH CV ECHO MEAS - SI(CUBED): 36.6 ML/M^2
BH CV ECHO MEAS - SI(LVOT): 37.7 ML/M^2
BH CV ECHO MEAS - SI(MOD-SP2): 38.6 ML/M^2
BH CV ECHO MEAS - SI(MOD-SP4): 25.2 ML/M^2
BH CV ECHO MEAS - SI(TEICH): 31.6 ML/M^2
BH CV ECHO MEAS - SV(AO): 174.3 ML
BH CV ECHO MEAS - SV(CUBED): 71.1 ML
BH CV ECHO MEAS - SV(LVOT): 73.3 ML
BH CV ECHO MEAS - SV(MOD-SP2): 75 ML
BH CV ECHO MEAS - SV(MOD-SP4): 49 ML
BH CV ECHO MEAS - SV(TEICH): 61.4 ML
BH CV ECHO MEAS - TAPSE (>1.6): 2.4 CM
BH CV ECHO MEASUREMENTS AVERAGE E/E' RATIO: 5.19
BH CV VAS BP RIGHT ARM: NORMAL MMHG
BH CV XLRA - RV BASE: 3.3 CM
BH CV XLRA - RV LENGTH: 8.6 CM
BH CV XLRA - RV MID: 2.6 CM
BH CV XLRA - TDI S': 15.1 CM/SEC
BILIRUB SERPL-MCNC: 0.6 MG/DL (ref 0–1.2)
BILIRUB UR QL STRIP: NEGATIVE
BUN SERPL-MCNC: 15 MG/DL (ref 6–20)
BUN/CREAT SERPL: 16.3 (ref 7–25)
CALCIUM SPEC-SCNC: 9.3 MG/DL (ref 8.6–10.5)
CHLORIDE SERPL-SCNC: 101 MMOL/L (ref 98–107)
CHOLEST SERPL-MCNC: 180 MG/DL (ref 0–200)
CLARITY UR: CLEAR
CO2 SERPL-SCNC: 26.1 MMOL/L (ref 22–29)
COLOR UR: YELLOW
CREAT SERPL-MCNC: 0.92 MG/DL (ref 0.76–1.27)
DEPRECATED RDW RBC AUTO: 43.6 FL (ref 37–54)
ERYTHROCYTE [DISTWIDTH] IN BLOOD BY AUTOMATED COUNT: 12.8 % (ref 12.3–15.4)
GFR SERPL CREATININE-BSD FRML MDRD: 86 ML/MIN/1.73
GLOBULIN UR ELPH-MCNC: 3.1 GM/DL
GLUCOSE BLDC GLUCOMTR-MCNC: 88 MG/DL (ref 70–130)
GLUCOSE BLDC GLUCOMTR-MCNC: 91 MG/DL (ref 70–130)
GLUCOSE SERPL-MCNC: 113 MG/DL (ref 65–99)
GLUCOSE UR STRIP-MCNC: NEGATIVE MG/DL
HBA1C MFR BLD: 5.1 % (ref 4.8–5.6)
HCT VFR BLD AUTO: 44 % (ref 37.5–51)
HDLC SERPL-MCNC: 42 MG/DL (ref 40–60)
HGB BLD-MCNC: 14.6 G/DL (ref 13–17.7)
HGB UR QL STRIP.AUTO: NEGATIVE
KETONES UR QL STRIP: NEGATIVE
LDLC SERPL CALC-MCNC: 125 MG/DL (ref 0–100)
LDLC/HDLC SERPL: 2.97 {RATIO}
LEFT ATRIUM VOLUME INDEX: 17 ML/M2
LEUKOCYTE ESTERASE UR QL STRIP.AUTO: NEGATIVE
MAXIMAL PREDICTED HEART RATE: 168 BPM
MCH RBC QN AUTO: 30.2 PG (ref 26.6–33)
MCHC RBC AUTO-ENTMCNC: 33.2 G/DL (ref 31.5–35.7)
MCV RBC AUTO: 91.1 FL (ref 79–97)
NITRITE UR QL STRIP: NEGATIVE
PH UR STRIP.AUTO: 7 [PH] (ref 5–8)
PLATELET # BLD AUTO: 319 10*3/MM3 (ref 140–450)
PMV BLD AUTO: 8.6 FL (ref 6–12)
POTASSIUM SERPL-SCNC: 4 MMOL/L (ref 3.5–5.2)
PROT SERPL-MCNC: 7.1 G/DL (ref 6–8.5)
PROT UR QL STRIP: NEGATIVE
RBC # BLD AUTO: 4.83 10*6/MM3 (ref 4.14–5.8)
SODIUM SERPL-SCNC: 138 MMOL/L (ref 136–145)
SP GR UR STRIP: 1.01 (ref 1–1.03)
STRESS TARGET HR: 143 BPM
TRIGL SERPL-MCNC: 67 MG/DL (ref 0–150)
UROBILINOGEN UR QL STRIP: NORMAL
VLDLC SERPL-MCNC: 13 MG/DL (ref 5–40)
WBC # BLD AUTO: 5.24 10*3/MM3 (ref 3.4–10.8)

## 2021-07-16 PROCEDURE — 97530 THERAPEUTIC ACTIVITIES: CPT

## 2021-07-16 PROCEDURE — 80061 LIPID PANEL: CPT | Performed by: INTERNAL MEDICINE

## 2021-07-16 PROCEDURE — G0378 HOSPITAL OBSERVATION PER HR: HCPCS

## 2021-07-16 PROCEDURE — 97165 OT EVAL LOW COMPLEX 30 MIN: CPT

## 2021-07-16 PROCEDURE — 70498 CT ANGIOGRAPHY NECK: CPT

## 2021-07-16 PROCEDURE — 71250 CT THORAX DX C-: CPT

## 2021-07-16 PROCEDURE — 25010000002 IOPAMIDOL 61 % SOLUTION: Performed by: HOSPITALIST

## 2021-07-16 PROCEDURE — 99204 OFFICE O/P NEW MOD 45 MIN: CPT | Performed by: PSYCHIATRY & NEUROLOGY

## 2021-07-16 PROCEDURE — 25010000002 PERFLUTREN (DEFINITY) 8.476 MG IN SODIUM CHLORIDE (PF) 0.9 % 10 ML INJECTION: Performed by: INTERNAL MEDICINE

## 2021-07-16 PROCEDURE — 83036 HEMOGLOBIN GLYCOSYLATED A1C: CPT | Performed by: INTERNAL MEDICINE

## 2021-07-16 PROCEDURE — 97161 PT EVAL LOW COMPLEX 20 MIN: CPT

## 2021-07-16 PROCEDURE — 0 GADOBENATE DIMEGLUMINE 529 MG/ML SOLUTION: Performed by: HOSPITALIST

## 2021-07-16 PROCEDURE — 70496 CT ANGIOGRAPHY HEAD: CPT

## 2021-07-16 PROCEDURE — 93306 TTE W/DOPPLER COMPLETE: CPT | Performed by: INTERNAL MEDICINE

## 2021-07-16 PROCEDURE — 93306 TTE W/DOPPLER COMPLETE: CPT

## 2021-07-16 PROCEDURE — 82962 GLUCOSE BLOOD TEST: CPT

## 2021-07-16 PROCEDURE — 70553 MRI BRAIN STEM W/O & W/DYE: CPT

## 2021-07-16 PROCEDURE — 81003 URINALYSIS AUTO W/O SCOPE: CPT | Performed by: INTERNAL MEDICINE

## 2021-07-16 PROCEDURE — A9577 INJ MULTIHANCE: HCPCS | Performed by: HOSPITALIST

## 2021-07-16 PROCEDURE — 80053 COMPREHEN METABOLIC PANEL: CPT | Performed by: INTERNAL MEDICINE

## 2021-07-16 PROCEDURE — 85027 COMPLETE CBC AUTOMATED: CPT | Performed by: INTERNAL MEDICINE

## 2021-07-16 RX ORDER — NITROGLYCERIN 0.4 MG/1
0.4 TABLET SUBLINGUAL
Status: DISCONTINUED | OUTPATIENT
Start: 2021-07-16 | End: 2021-07-17 | Stop reason: HOSPADM

## 2021-07-16 RX ADMIN — GADOBENATE DIMEGLUMINE 15 ML: 529 INJECTION, SOLUTION INTRAVENOUS at 22:09

## 2021-07-16 RX ADMIN — MECLIZINE HYDROCHLORIDE 25 MG: 25 TABLET ORAL at 22:34

## 2021-07-16 RX ADMIN — ATORVASTATIN CALCIUM 80 MG: 80 TABLET, FILM COATED ORAL at 00:03

## 2021-07-16 RX ADMIN — SODIUM CHLORIDE, PRESERVATIVE FREE 10 ML: 5 INJECTION INTRAVENOUS at 08:52

## 2021-07-16 RX ADMIN — BUPROPION HYDROCHLORIDE 150 MG: 150 TABLET, FILM COATED, EXTENDED RELEASE ORAL at 08:46

## 2021-07-16 RX ADMIN — ATORVASTATIN CALCIUM 80 MG: 80 TABLET, FILM COATED ORAL at 22:34

## 2021-07-16 RX ADMIN — PAROXETINE HYDROCHLORIDE HEMIHYDRATE 20 MG: 20 TABLET, FILM COATED ORAL at 06:34

## 2021-07-16 RX ADMIN — IOPAMIDOL 95 ML: 612 INJECTION, SOLUTION INTRAVENOUS at 09:22

## 2021-07-16 RX ADMIN — SODIUM CHLORIDE, PRESERVATIVE FREE 10 ML: 5 INJECTION INTRAVENOUS at 22:35

## 2021-07-16 RX ADMIN — MECLIZINE HYDROCHLORIDE 25 MG: 25 TABLET ORAL at 00:03

## 2021-07-16 RX ADMIN — MECLIZINE HYDROCHLORIDE 25 MG: 25 TABLET ORAL at 06:34

## 2021-07-16 RX ADMIN — SODIUM CHLORIDE 75 ML/HR: 9 INJECTION, SOLUTION INTRAVENOUS at 00:08

## 2021-07-16 RX ADMIN — PERFLUTREN 2 ML: 6.52 INJECTION, SUSPENSION INTRAVENOUS at 10:33

## 2021-07-16 RX ADMIN — SODIUM CHLORIDE, PRESERVATIVE FREE 10 ML: 5 INJECTION INTRAVENOUS at 00:03

## 2021-07-16 RX ADMIN — MECLIZINE HYDROCHLORIDE 25 MG: 25 TABLET ORAL at 14:20

## 2021-07-16 RX ADMIN — ASPIRIN 81 MG: 81 TABLET, CHEWABLE ORAL at 08:46

## 2021-07-16 NOTE — THERAPY EVALUATION
Patient Name: Raymon Gonzalez  : 1970    MRN: [id number]                              Today's Date: 2021       Admit Date: 7/15/2021    Visit Dx:     ICD-10-CM ICD-9-CM   1. Vertigo  R42 780.4     Patient Active Problem List   Diagnosis   • Encounter for screening for malignant neoplasm of colon   • Vertigo   • Depression   • Anxiety     Past Medical History:   Diagnosis Date   • Anxiety    • Depression      Past Surgical History:   Procedure Laterality Date   • COLONOSCOPY N/A 2020    Procedure: COLONOSCOPY to cecum: cold snare and biopsy polypectomies;  Surgeon: Dennis Gomez MD;  Location: [facility] ENDOSCOPY;  Service: Gastroenterology;  Laterality: N/A;  screening  post:  polyps,   • KNEE ARTHROSCOPY W/ ACL RECONSTRUCTION Right 2004     General Information     Row Name 21 1454          Physical Therapy Time and Intention    Document Type  evaluation  -EM     Mode of Treatment  individual therapy;physical therapy  -EM     Row Name 21 1454          General Information    Patient Profile Reviewed  yes  -EM     Prior Level of Function  independent:  -EM     Existing Precautions/Restrictions  -- pt not on bed alarm, reports he has been up to bathroom independently  -EM     Row Name 21 1454          Living Environment    Lives With  alone  -EM     Row Name 21 1454          Cognition    Orientation Status (Cognition)  oriented x 4  -EM     Row Name 21 1458          Safety Issues, Functional Mobility    Impairments Affecting Function (Mobility)  -- pt reports no symptoms of dizziness today  -EM       User Key  (r) = Recorded By, (t) = Taken By, (c) = Cosigned By    Initials Name Provider Type    EM Allyssa Jackson, PT Physical Therapist        Mobility     Row Name 21 1456          Bed Mobility    Bed Mobility  bed mobility (all) activities  -EM     All Activities, [place] (Bed Mobility)  independent  -EM     Assistive Device (Bed Mobility)  head of  bed elevated  -EM     [facility] Name 07/16/21 1456          Sit-Stand Transfer    Sit-Stand [place] (Transfers)  independent  -EM     Row Name 07/16/21 1456          Gait/Stairs (Locomotion)    [place] Level (Gait)  standby assist  -EM     Distance in Feet (Gait)  400  -EM     Comment (Gait/Stairs)  patient ambulated with normal gait and anna, looking side to side without any c/o dizziness  -EM       User Key  (r) = Recorded By, (t) = Taken By, (c) = Cosigned By    Initials Name Provider Type    Allyssa Reis PT Physical Therapist        Obj/Interventions     Row Name 07/16/21 1457          Range of Motion Comprehensive    General Range of Motion  no range of motion deficits identified  -EM     Row Name 07/16/21 1457          Strength Comprehensive (MMT)    General Manual Muscle Testing (MMT) Assessment  no strength deficits identified  -EM     Row Name 07/16/21 1457          Balance    Balance Assessment  standing dynamic balance  -EM     Static Sitting Balance  WNL  -EM     Dynamic Sitting Balance  WNL  -EM     Static Standing Balance  WNL  -EM     Dynamic Standing Balance  WNL  -EM     Row Name 07/16/21 [facility]7          Sensory Assessment (Somatosensory)    Sensory Assessment (Somatosensory)  sensation intact  -EM       User Key  (r) = Recorded By, (t) = Taken By, (c) = Cosigned By    Initials Name Provider Type    Allyssa Reis PT Physical Therapist        Goals/Plan    No documentation.       Clinical Impression     Row Name 07/16/21 1457          Pain Scale: Numbers Pre/Post-Treatment    Pretreatment Pain Rating  0/10 - no pain  -EM     Row Name 07/16/21 1457          Plan of Care Review    Plan of Care Reviewed With  patient  -EM     Outcome Summary  patient is 50 yo male admitted to [facility] for vertigo, r/o CVA. Patient sitting up in bed when entered, awake and alert, texting on phone. Patient subjectively reports he feels much better today, states he has been up to bathroom ad rena, no  further c/o dizziness but declined to reach down to tie his shoes. Patient performed bed mobility and transfers independently, ambulated 400 feet with SBA, able to turn head and visually scan from side to side while walking without illiciting any dizziness. Patient had no nystagmus with position changes although did not lie completely flat in the bed. Patient lives alone, independent prior to admission and appears to be at functional baseline. Patient asymptomatic at this time, recommend f/u with outpatient vestibular PT if symtpoms recur.  -EM     Row Name 07/16/21 0266          Therapy Assessment/Plan (PT)    Patient/Family Therapy Goals Statement (PT)  go home  -EM     Criteria for Skilled Interventions Met (PT)  no problems identified which require skilled intervention  -EM     Row Name 07/16/21 1451          Positioning and Restraints    Pre-Treatment Position  in bed  -EM     Post Treatment Position  bed  -EM     In Bed  sitting;call light within reach  -EM       User Key  (r) = Recorded By, (t) = Taken By, (c) = Cosigned By    Initials Name Provider Type    EM Allyssa Jackson, PT Physical Therapist        Outcome Measures     Row Name 07/16/21 1501          How much help from another person do you currently need...    Turning from your back to your side while in flat bed without using bedrails?  4  -EM     Moving from lying on back to sitting on the side of a flat bed without bedrails?  4  -EM     Moving to and from a bed to a chair (including a wheelchair)?  4  -EM     Standing up from a chair using your arms (e.g., wheelchair, bedside chair)?  4  -EM     Climbing 3-5 steps with a railing?  3  -EM     To walk in hospital room?  3  -EM     AM-PAC 6 Clicks Score (PT)  22  -EM     Row Name 07/16/21 1501 07/16/21 0839       Modified Nance Scale    Modified Nance Scale  1 - No significant disability despite symptoms.  Able to carry out all usual duties and activities.  -EM  1 - No significant disability  despite symptoms.  Able to carry out all usual duties and activities.  -BL    Row Name 07/16/21 1501 07/16/21 0830       Functional Assessment    Outcome Measure Options  AM-PAC 6 Clicks Basic Mobility (PT);Modified [place]  -EM  AM-PAC 6 Clicks Daily Activity (OT);Modified [place]  -BL      User Key  (r) = Recorded By, (t) = Taken By, (c) = Cosigned By    Initials Name Provider Type    EM Allyssa Jackson, PT Physical Therapist    Yfn Grissom, OT Occupational Therapist        Physical Therapy Education                 Title: PT OT SLP Therapies (In Progress)     Topic: Physical Therapy (Done)     Point: Mobility training (Done)     Learning Progress Summary           Patient Acceptance, E, MADHAVI,DU by EM at 7/16/2021 1502                               User Key     Initials Effective Dates Name Provider Type Discipline     06/16/21 -  Allyssa Jackson, PT Physical Therapist PT              PT Recommendation and Plan     Plan of Care Reviewed With: patient  Outcome Summary: patient is 50 yo male admitted to [facility] for vertigo, r/o CVA. Patient sitting up in bed when entered, awake and alert, texting on phone. Patient subjectively reports he feels much better today, states he has been up to bathroom ad rena, no further c/o dizziness but declined to reach down to tie his shoes. Patient performed bed mobility and transfers independently, ambulated 400 feet with SBA, able to turn head and visually scan from side to side while walking without illiciting any dizziness. Patient had no nystagmus with position changes although did not lie completely flat in the bed. Patient lives alone, independent prior to admission and appears to be at functional baseline. Patient asymptomatic at this time, recommend f/u with outpatient vestibular PT if symtpoms recur.     Time Calculation:   PT Charges     Row Name 07/16/21 1503             Time Calculation    Start Time  1138  -EM      Stop Time  1150  -EM      Time Calculation  (min)  12 min  -EM      PT Received On  07/16/21  -EM        User Key  (r) = Recorded By, (t) = Taken By, (c) = Cosigned By    Initials Name Provider Type    Allyssa Reis PT Physical Therapist        Therapy Charges for Today     Code Description Service Date Service Provider Modifiers Qty    14691053251 HC PT EVAL LOW COMPLEXITY 1 7/16/2021 Allyssa Jackson PT GP 1          PT G-Codes  Outcome Measure Options: AM-PAC 6 Clicks Basic Mobility (PT), Modified [place]  AM-PAC 6 Clicks Score (PT): 22  AM-PAC 6 Clicks Score (OT): 24  Modified [place] Scale: 1 - No significant disability despite symptoms.  Able to carry out all usual duties and activities.    Allyssa Jackson, PT  7/16/2021

## 2021-07-16 NOTE — PROGRESS NOTES
Acute Inpatient Rehab Note    Acute rehab referral received via stroke order set. Please note that the acute rehab admission RNs will not be actively evaluating this patient. If it is felt that the patient is or will be acute rehab appropriate please call the admissions office at [phone] and a full evaluation will be initiated.    Jeanette Tay, RN  Rehab Admission Nurse

## 2021-07-16 NOTE — PLAN OF CARE
Goal Outcome Evaluation:  Plan of Care Reviewed With: patient        Progress: improving  Outcome Summary: Pt seen by OT this date for evaluation. Pt is a 50 y/o male admit to [facility] for vertigo and spinning. Pt reports that he lives alone at home and is independent with ADLs/IADLs at baseline. Pt presents this date with no acute OT needs at this time as pt is independent with all ADLs and is SBA for all functional transfers and mobility due to slight dizziness. Pt educated on safety when being mobile this date as pt receptive to information. Pt plans to go home at D/C as OT to sign off this date. Pt left supine, needs in reach. OT wore mask, gloves, glasses, hand hygiene performed.

## 2021-07-16 NOTE — PLAN OF CARE
Goal Outcome Evaluation:  Plan of Care Reviewed With: patient           Outcome Summary: patient is 50 yo male admitted to [facility] for vertigo, r/o CVA. Patient sitting up in bed when entered, awake and alert, texting on phone. Patient subjectively reports he feels much better today, states he has been up to bathroom ad rena, no further c/o dizziness but declined to reach down to tie his shoes. Patient performed bed mobility and transfers independently, ambulated 400 feet with SBA, able to turn head and visually scan from side to side while walking without illiciting any dizziness. Patient had no nystagmus with position changes although did not lie completely flat in the bed. Patient lives alone, independent prior to admission and appears to be at functional baseline. Patient asymptomatic at this time, recommend f/u with outpatient vestibular PT if symtpoms recur.  Patient was intermittently wearing a face mask during this therapy encounter. Therapist used appropriate personal protective equipment including eye protection, mask, and gloves.  Mask used was standard procedure mask. Appropriate PPE was worn during the entire therapy session. Hand hygiene was completed before and after therapy session. Patient is not in enhanced droplet precautions.

## 2021-07-16 NOTE — THERAPY EVALUATION
Patient Name: Raymon Gonzalez  : 1970    MRN: [id number]                              Today's Date: 2021       Admit Date: 7/15/2021    Visit Dx:     ICD-10-CM ICD-9-CM   1. Vertigo  R42 780.4     Patient Active Problem List   Diagnosis   • Encounter for screening for malignant neoplasm of colon   • Vertigo   • Depression   • Anxiety     Past Medical History:   Diagnosis Date   • Anxiety    • Depression      Past Surgical History:   Procedure Laterality Date   • COLONOSCOPY N/A 2020    Procedure: COLONOSCOPY to cecum: cold snare and biopsy polypectomies;  Surgeon: Dennis Gomez MD;  Location: [facility] ENDOSCOPY;  Service: Gastroenterology;  Laterality: N/A;  screening  post:  polyps,   • KNEE ARTHROSCOPY W/ ACL RECONSTRUCTION Right      General Information     Row Name 21          OT Time and Intention    Document Type  evaluation;discharge evaluation/summary  -BL     Mode of Treatment  individual therapy;occupational therapy  -BL     Row Name 21          General Information    Patient Profile Reviewed  yes  -BL     Prior Level of Function  independent:;ADL's;feeding;grooming;dressing;bathing  -BL     Existing Precautions/Restrictions  fall  -BL     Barriers to Rehab  none identified  -BL     Row Name 21          Occupational Profile    Reason for Services/Referral (Occupational Profile)  Pt is a male admit to [facility] due to spinning and vertigo. Pt reports that he lives at home alone and is independent with all ADLs/IADLs at baseline. Pt reports that he does not use any AD for functional mobility. Pt presents this daet with no acute OT needs at this time as pt is independent with all ADLs/IADLs and SBA for functional mobility for safety at this time. OT to sign off this date.  -BL     Row Name 21          Living Environment    Lives With  alone  -BL     Row Name 21          Cognition    Orientation Status (Cognition)  oriented x 4  -BL      Row Name 07/16/21 0830          Safety Issues, Functional Mobility    Safety Issues Affecting Function (Mobility)  insight into deficits/self-awareness  -     Impairments Affecting Function (Mobility)  balance  -     Comment, Safety Issues/Impairments (Mobility)  dizziness  -       User Key  (r) = Recorded By, (t) = Taken By, (c) = Cosigned By    Initials Name Provider Type     Yfn Stokes OT Occupational Therapist          Mobility/ADL's     Row Name 07/16/21 0834          Bed Mobility    Bed Mobility  bed mobility (all) activities  -     All Activities, [place] (Bed Mobility)  independent  -     Row Name 07/16/21 0834          Transfers    Transfers  sit-stand transfer  -     Sit-Stand [place] (Transfers)  standby assist  -BL     Row Name 07/16/21 0834          Activities of Daily Living    BADL Assessment/Intervention  lower body dressing  -[facility] Name 07/16/21 0834          Lower Body Dressing Assessment/Training    [place] Level (Lower Body Dressing)  doff;don;socks;independent  -     Position (Lower Body Dressing)  supine  -       User Key  (r) = Recorded By, (t) = Taken By, (c) = Cosigned By    Initials Name Provider Type     Yfn Stokes OT Occupational Therapist        Obj/Interventions     [facility] Name 07/16/21 0835          Sensory Assessment (Somatosensory)    Sensory Assessment (Somatosensory)  UE sensation intact  -BL     Row Name 07/16/21 0835          Vision Assessment/Intervention    Visual Impairment/Limitations  WFL;corrective lenses for reading  -[facility] Name 07/16/21 0835          Range of Motion Comprehensive    General Range of Motion  no range of motion deficits identified  -BL     Row Name 07/16/21 0835          Strength Comprehensive (MMT)    Comment, General Manual Muscle Testing (MMT) Assessment  BUE 4/5  -[facility] Name 07/16/21 0835          Motor Skills    Motor Skills  coordination  -     Coordination  WFL;bilateral;upper  extremity;dysdiadochokinesia;dysmetria  -BL     Row Name 07/16/21 0835          Balance    Balance Assessment  sitting static balance;sitting dynamic balance;sit to stand dynamic balance;standing static balance  -BL     Static Sitting Balance  WNL;unsupported;sitting, edge of bed  -BL     Dynamic Sitting Balance  WNL;unsupported;sitting, edge of bed  -BL     Sit to Stand Dynamic Balance  mild impairment;unsupported;standing  -BL     Static Standing Balance  mild impairment;unsupported;standing  -BL     Balance Interventions  sitting;standing;sit to stand;supported;static  -BL       User Key  (r) = Recorded By, (t) = Taken By, (c) = Cosigned By    Initials Name Provider Type    Yfn Grissom OT Occupational Therapist        Goals/Plan     Row Name 07/16/21 0839          Bed Mobility Goal 1 (OT)    Activity/Assistive Device (Bed Mobility Goal 1, OT)  bed mobility activities, all  -BL     [place] Level/Cues Needed (Bed Mobility Goal 1, OT)  independent  -BL     Time Frame (Bed Mobility Goal 1, OT)  short term goal (STG);1 day  -BL     Progress/Outcomes (Bed Mobility Goal 1, OT)  goal met  -BL       User Key  (r) = Recorded By, (t) = Taken By, (c) = Cosigned By    Initials Name Provider Type    Yfn Grissom OT Occupational Therapist        Clinical Impression     Row Name 07/16/21 0836          Pain Assessment    Additional Documentation  Pain Scale: Numbers Pre/Post-Treatment (Group)  -BL     Row Name 07/16/21 0836          Pain Scale: Numbers Pre/Post-Treatment    Pretreatment Pain Rating  0/10 - no pain  -BL     Posttreatment Pain Rating  0/10 - no pain  -BL     Row Name 07/16/21 0836          Plan of Care Review    Plan of Care Reviewed With  patient  -BL     Progress  improving  -BL     Outcome Summary  Pt seen by OT this date for evaluation. Pt is a 50 y/o male admit to [facility] for vertigo and spinning. Pt reports that he lives alone at home and is independent with ADLs/IADLs at baseline. Pt  presents this date with no acute OT needs at this time as pt is independent with all ADLs and is SBA for all functional transfers and mobility due to slight dizziness. Pt educated on safety when being mobile this date as pt receptive to information. Pt plans to go home at D/C as OT to sign off this date. Pt left supine, needs in reach. OT wore mask, gloves, glasses, hand hygiene performed.  -     Row Name 07/16/21 0836          Therapy Assessment/Plan (OT)    Therapy Frequency (OT)  evaluation only  -     Row Name 07/16/21 0836          Therapy Plan Review/Discharge Plan (OT)    Anticipated Discharge Disposition (OT)  home  -BL     Row Name 07/16/21 0836          Vital Signs    Pre Patient Position  Supine  -BL     Intra Patient Position  Standing  -BL     Post Patient Position  Supine  -BL     Row Name 07/16/21 0836          Positioning and Restraints    Pre-Treatment Position  in bed  -BL     Post Treatment Position  bed  -BL     In Bed  notified nsg;supine;call light within reach  -BL       User Key  (r) = Recorded By, (t) = Taken By, (c) = Cosigned By    Initials Name Provider Type    BL Yfn Stokes, OT Occupational Therapist        Outcome Measures     Row Name 07/16/21 0839          How much help from another is currently needed...    Putting on and taking off regular lower body clothing?  4  -BL     Bathing (including washing, rinsing, and drying)  4  -BL     Toileting (which includes using toilet bed pan or urinal)  4  -BL     Putting on and taking off regular upper body clothing  4  -BL     Taking care of personal grooming (such as brushing teeth)  4  -BL     Eating meals  4  -BL     AM-PAC 6 Clicks Score (OT)  24  -BL     Row Name 07/16/21 0839          Modified [place] Scale    Modified [place] Scale  1 - No significant disability despite symptoms.  Able to carry out all usual duties and activities.  -BL     Row Name 07/16/21 0839          Functional Assessment    Outcome Measure Options  AM-PAC 6  Clicks Daily Activity (OT);Modified Door  -       User Key  (r) = Recorded By, (t) = Taken By, (c) = Cosigned By    Initials Name Provider Type     Yfn Stokes OT Occupational Therapist        Occupational Therapy Education                 Title: PT OT SLP Therapies (In Progress)     Topic: Occupational Therapy (In Progress)     Point: ADL training (Done)     Description:   Instruct learner(s) on proper safety adaptation and remediation techniques during self care or transfers.   Instruct in proper use of assistive devices.              Learning Progress Summary           Patient Acceptance, E, VU by  at 7/16/2021 0840    Comment: The role of OT, safety with mibility                   Point: Home exercise program (Not Started)     Description:   Instruct learner(s) on appropriate technique for monitoring, assisting and/or progressing therapeutic exercises/activities.              Learner Progress:  Not documented in this visit.          Point: Precautions (Not Started)     Description:   Instruct learner(s) on prescribed precautions during self-care and functional transfers.              Learner Progress:  Not documented in this visit.          Point: Body mechanics (Not Started)     Description:   Instruct learner(s) on proper positioning and spine alignment during self-care, functional mobility activities and/or exercises.              Learner Progress:  Not documented in this visit.                      User Key     Initials Effective Dates Name Provider Type Discipline     01/05/21 -  Yfn Stokes OT Occupational Therapist OT              OT Recommendation and Plan  Therapy Frequency (OT): evaluation only  Plan of Care Review  Plan of Care Reviewed With: patient  Progress: improving  Outcome Summary: Pt seen by OT this date for evaluation. Pt is a 52 y/o male admit to [facility] for vertigo and spinning. Pt reports that he lives alone at home and is independent with ADLs/IADLs at baseline. Pt  presents this date with no acute OT needs at this time as pt is independent with all ADLs and is SBA for all functional transfers and mobility due to slight dizziness. Pt educated on safety when being mobile this date as pt receptive to information. Pt plans to go home at D/C as OT to sign off this date. Pt left supine, needs in reach. OT wore mask, gloves, glasses, hand hygiene performed.     Time Calculation:   Time Calculation- OT     Row Name 07/16/21 0840             Time Calculation- OT    OT Start Time  0816  -BL      OT Stop Time  0828  -BL      OT Time Calculation (min)  12 min  -BL      Total Timed Code Minutes- OT  8 minute(s)  -BL      OT Received On  07/16/21  -BL      OT - Next Appointment  -- OT to sign off, pt with no OT needs at this time  -BL         Timed Charges    64743 - OT Therapeutic Activity Minutes  8  -BL         Untimed Charges    OT Eval/Re-eval Minutes  4  -BL         Total Minutes    Timed Charges Total Minutes  8  -BL      Untimed Charges Total Minutes  4  -BL       Total Minutes  12  -BL        User Key  (r) = Recorded By, (t) = Taken By, (c) = Cosigned By    Initials Name Provider Type    BL Yfn Stokes OT Occupational Therapist        Therapy Charges for Today     Code Description Service Date Service Provider Modifiers Qty    44957020023 HC OT THERAPEUTIC ACT EA 15 MIN 7/16/2021 Yfn Stokes OT GO 1    92790472235 HC OT EVAL LOW COMPLEXITY 2 7/16/2021 Yfn Stokes OT GO 1               Yfn Stokes OT  7/16/2021

## 2021-07-16 NOTE — PLAN OF CARE
Goal Outcome Evaluation:  Plan of Care Reviewed With: patient   Pt presents as a normally healthy 50y/o male experiencing dizziness.  NIHSS - 0.  Pt denies pain, SOA, chest pain.  Pt appears calm, cooperative.  Pt denies any ideas of self-harm for over 1 month.  Pt has no neuro deficits other than stated dizziness.

## 2021-07-16 NOTE — PLAN OF CARE
Goal Outcome Evaluation:  Plan of Care Reviewed With: other (see comments) (RN)   Outcome Summary: Orders received per stroke protocol. Patient admitted with dizziness. NIH 0. Patient passed swallow screen x2 and is tolerating a regular diet per RN. No evaluation warranted at this time. Please re-consult as indicated. Thank you.

## 2021-07-16 NOTE — PROGRESS NOTES
"DAILY PROGRESS NOTE  [facility]    Patient Identification:  Name: Raymon Gonzalez  Age: 51 y.o.  Sex: male  :  1970  MRN: [id number]         Primary Care Physician: Chris Meeks MD      Subjective  Patient presently feeling much better.  Vertigo much improved.  In taking his history there is definitely a positional component to this.    Objective:  General Appearance:  Comfortable, well-appearing, in no acute distress and not in pain.    Vital signs: (most recent): Blood pressure 112/64, pulse 73, temperature 98 °F (36.7 °C), temperature source Oral, resp. rate 16, height 180.3 cm (71\"), weight 74.8 kg (165 lb), SpO2 97 %.    Lungs:  Normal effort and normal respiratory rate.  Breath sounds clear to auscultation.    Heart: Normal rate.  Regular rhythm.    Extremities: There is no dependent edema.    Neurological: Patient is alert.  Normal strength.  (With the Bluff-Hallpike maneuver the vertigo is brought on with the head to the right.  Significant nystagmus is noted.  There is a vertical component to it.  Vertigo resolved within 30 seconds after sitting back up again.  No vertigo no nystagmus with the head turned to the left.).    Skin:  Warm and dry.                Vital signs in last 24 hours:  Temp:  [97.1 °F (36.2 °C)-98 °F (36.7 °C)] 98 °F (36.7 °C)  Heart Rate:  [60-79] 73  Resp:  [16-17] 16  BP: (102-128)/(64-84) 112/64    Intake/Output:    Intake/Output Summary (Last 24 hours) at 2021 1259  Last data filed at 2021 0557  Gross per 24 hour   Intake 425 ml   Output --   Net 425 ml         Results from last 7 days   Lab Units 21  0820 07/15/21  1642   WBC 10*3/mm3 5.24 7.99   HEMOGLOBIN g/dL 14.6 13.9   PLATELETS 10*3/mm3 319 354     Results from last 7 days   Lab Units 21  0820 07/15/21  1642   SODIUM mmol/L 138 137   POTASSIUM mmol/L 4.0 3.9   CHLORIDE mmol/L 101 102   CO2 mmol/L 26.1 25.4   BUN mg/dL 15 21*   CREATININE mg/dL 0.92 0.97   GLUCOSE mg/dL " 113* 88   Estimated Creatinine Clearance: 99.4 mL/min (by C-G formula based on SCr of 0.92 mg/dL).  Results from last 7 days   Lab Units 07/16/21  0820 07/15/21  1642   CALCIUM mg/dL 9.3 9.2   ALBUMIN g/dL 4.00 4.20   MAGNESIUM mg/dL  --  2.4     Results from last 7 days   Lab Units 07/16/21  0820 07/15/21  1642   ALBUMIN g/dL 4.00 4.20   BILIRUBIN mg/dL 0.6 0.3   ALK PHOS U/L 76 74   AST (SGOT) U/L 18 15   ALT (SGPT) U/L 19 20       Assessment:  Benign positional vertigo: Right posterior semicircular canal.  Discussed vestibular rehab.  As needed Antivert.  Incidental finding of right upper lobe pulmonary nodule.  Discussed with patient.  We will go ahead and get a full chest CAT scan while here.  Depending results further follow-up could be done as an outpatient.  Depression/anxiety: Continue home meds.    Plan:  Please see above.  Discharge when okay with neurology.    Scottie Hinojosa MD  7/16/2021  12:59 EDT

## 2021-07-17 VITALS
HEART RATE: 57 BPM | DIASTOLIC BLOOD PRESSURE: 70 MMHG | BODY MASS INDEX: 23.1 KG/M2 | SYSTOLIC BLOOD PRESSURE: 111 MMHG | OXYGEN SATURATION: 98 % | TEMPERATURE: 97.6 F | RESPIRATION RATE: 16 BRPM | HEIGHT: 71 IN | WEIGHT: 165 LBS

## 2021-07-17 PROCEDURE — G0378 HOSPITAL OBSERVATION PER HR: HCPCS

## 2021-07-17 RX ORDER — MECLIZINE HYDROCHLORIDE 25 MG/1
25 TABLET ORAL 3 TIMES DAILY PRN
Qty: 21 TABLET | Refills: 0 | Status: SHIPPED | OUTPATIENT
Start: 2021-07-17

## 2021-07-17 RX ADMIN — SODIUM CHLORIDE, PRESERVATIVE FREE 10 ML: 5 INJECTION INTRAVENOUS at 08:15

## 2021-07-17 RX ADMIN — PAROXETINE HYDROCHLORIDE HEMIHYDRATE 20 MG: 20 TABLET, FILM COATED ORAL at 06:29

## 2021-07-17 RX ADMIN — MECLIZINE HYDROCHLORIDE 25 MG: 25 TABLET ORAL at 06:28

## 2021-07-17 RX ADMIN — ASPIRIN 81 MG: 81 TABLET, CHEWABLE ORAL at 08:15

## 2021-07-17 RX ADMIN — BUPROPION HYDROCHLORIDE 150 MG: 150 TABLET, FILM COATED, EXTENDED RELEASE ORAL at 08:15

## 2021-07-17 NOTE — CONSULTS
[facility]    Reason for consult: lung nodule    HPI:  Mr. Gonzalez is a 52yo WM he was admitted here for vertigo and as part of evaluation imaging of the head and neck was obtained.  An incidental right upper lobe lung nodule was seen.  I dedicated ct chest was obtained confirming the presence of an irregular 1.7X1.3cm right upper lobe nodule.  It is located centrally in that lung. Mr. Gonzalez has no pulmonary symptoms    Past Medical History:   Diagnosis Date   • Anxiety    • Depression      Social History     Socioeconomic History   • Marital status:      Spouse name: Not on file   • Number of children: Not on file   • Years of education: Not on file   • Highest education level: Not on file   Tobacco Use   • Smoking status: Never Smoker   Substance and Sexual Activity   • Alcohol use: Not Currently   • Drug use: Never   • Sexual activity: Defer     History reviewed. No pertinent family history.  Meds: reviewed as per chart  ALL: pcn  ROS: 12 point negative except as in HPI    Vital Sign Min/Max for last 24 hours  Temp  Min: 97.6 °F (36.4 °C)  Max: 98.3 °F (36.8 °C)   BP  Min: 103/67  Max: 123/65   Pulse  Min: 55  Max: 79   Resp  Min: 16  Max: 16   SpO2  Min: 95 %  Max: 98 %   No data recorded   Weight  Min: 74.8 kg (165 lb)  Max: 74.8 kg (165 lb)     GEN:  appears ill, AxOx3  HEENT: PERRL, EOMI, no icterus, mmm, no jvd, trachea midline, neck supple  CHEST: CTA bilat, no wheezes, no crackles, no use of accessory muscles  CV: RRR, no m/g/r  ABD: soft, nt, nd +bs, no hepatosplenomegaly  EXT: no c/c/e  SKIN: no rashes, no xanthomas, nl turgor, warm, dry  LYMPH: no palpable cervical or supraclavicular lymphadenopathy  NEURO: CN 2-12 intact and symmetric bilaterally  PSYCH: nl affect, nl orientation, nl judgement, nl mood  MSK no kyphoscoliosis, 5/5 strength ue and le bilaterally    Labs: 7/17: reviewed:  Wbc 5  hgb 14.6  plts 319  Na 138  Bicarb 26  Ct chest: 7/16: reviewed as in HPI and dictated  report    A/P:  1. Lung nodule -- would be high risk for pneumothorax with percutaneous biopsy.  Looks like it might be accessible via navigational bronch.  I would like to get pet/ct as outpatient and then decide on navigational bronch or not.    2. Vertigo    ok to follow up as outpatient but needs to be ASAP

## 2021-07-17 NOTE — NURSING NOTE
Spoke with Love in CT.  In order to reformat CT, a new order would be required and the patient would need to be scanned again.  Abimbola Castillo RN notified.

## 2021-07-17 NOTE — DISCHARGE SUMMARY
"                                                                   PHYSICIAN DISCHARGE SUMMARY                                                                        [facility]    Patient Identification:  Name: Raymon Gonzalez  Age: 51 y.o.  Sex: male  :  1970  MRN: [id number]  Primary Care Physician: Chris Meeks MD    Admit date: 7/15/2021  Discharge date and time: 2021     Discharged Condition: good    Discharge Diagnoses:  Benign positional vertigo:   Pulmonary nodule    Depression/anxiety: .      Hospital Course:  Pleasant 51-year-old gentleman presenting with new onset vertigo.  Please H&P for full details.  He was initially admitted and put through CVA protocol.  Minus on the next day he had a classic response of Roman-Hallpike maneuver for a right posterior semicircular canal issue.  He was seen by neurology.  Educated in management of vertigo.  CT, MRI of the head were all unremarkable.  Echocardiogram also unremarkable.  On the CT scan there is incidentally noted partial visualization of a pulmonary nodule.  We did do a full CT of the chest which revealed \"1.7 cm irregular nodule opacity right upper lobe is nonspecific\".  Pulmonary consultation was obtained.  Plans are to do an outpatient PET scan and follow-up with pulmonology thereafter.  He remains afebrile vital signs stable.  Vertigo is improved today although not fully resolved.      Consults:     Consults     Date and Time Order Name Status Description    2021  7:18 PM Inpatient Pulmonology Consult Completed     7/15/2021 10:15 PM Inpatient Neurology Consult Stroke Completed     7/15/2021  6:51 PM [facility] (on-call MD unless specified) Details Completed             Discharge Exam:  Afebrile vital signs stable.  Well-developed well-nourished male no apparent distress.  Lungs clear to auscultation good air movement.  Heart regular rate and rhythm.  Extremities no clubbing cyanosis edema.  Neurologically he is alert " oriented conversant cooperative pleasant.  No lateralizing signs present.  No nystagmus.  No vertigo and less exacerbated by quick head movements.     Disposition:  Home    Patient Instructions:      Discharge Medications      New Medications      Instructions Start Date   meclizine 25 MG tablet  Commonly known as: ANTIVERT   25 mg, Oral, 3 Times Daily PRN         Continue These Medications      Instructions Start Date   buPROPion  MG 24 hr tablet  Commonly known as: WELLBUTRIN XL   150 mg, Oral, Daily      PARoxetine 20 MG tablet  Commonly known as: PAXIL   20 mg, Oral, Every Morning      Testosterone 20.25 MG/1.25GM (1.62%) gel   3 pumps to skin once a day- DISPENSE ANDROGEL (GENERIC) PUMP           Diet Instructions     Diet: Regular      Discharge Diet: Regular        No future appointments.  Additional Instructions for the Follow-ups that You Need to Schedule     Discharge Follow-up with PCP   As directed       Currently Documented PCP:    Chris Meeks MD    PCP Phone Number:    [phone]     Follow Up Details: 1 week         Discharge Follow-up with Specialty: Pulmonology.  Neurology.   As directed      Specialty: Pulmonology.  Neurology.    Follow Up Details: As directed.           Follow-up Information     Chris Meeks MD .    Specialty: Family Medicine  Why: 1 week  Contact information:  [address] [facility] [address]  [phone]                 Discharge Order (From admission, onward)     Start     Ordered    07/17/21 0813  Discharge patient  Once     Expected Discharge Date: 07/17/21    Discharge Disposition: Home or Self Care    Physician of Record for Attribution - Please select from Treatment Team: SANAM LECHUGA [[id number]]    Review needed by CMO to determine Physician of Record: No       Question Answer Comment   Physician of Record for Attribution - Please select from Treatment Team SANAM LECHUGA.    Review needed by CMO to determine Physician of Record  No        07/17/21 0816                  Total time spent discharging patient including evaluation,post hospitalization follow up,  medication and post hospitalization instructions and education total time exceeds 30 minutes.    Signed:  Scottie Hinojosa MD  7/17/2021  08:21 EDT    EMR Dragon/Transcription disclaimer:   Much of this encounter note is an electronic transcription/translation of spoken language to printed text. The electronic translation of spoken language may permit erroneous, or at times, nonsensical words or phrases to be inadvertently transcribed; Although I have reviewed the note for such errors, some may still exist.

## 2021-07-17 NOTE — PLAN OF CARE
Goal Outcome Evaluation:  Plan of Care Reviewed With: patient        Progress: no change  VSS, no c/o pain. Plans for a PET scan as an outpatient

## 2021-07-17 NOTE — PROGRESS NOTES
Acute rehab referral received via stroke order set. Patient with NIHSS 0. Will sign off.     Thank you!    Jeronimo Lepe RN  p

## 2021-07-19 ENCOUNTER — TRANSCRIBE ORDERS (OUTPATIENT)
Dept: ADMINISTRATIVE | Facility: HOSPITAL | Age: 51
End: 2021-07-19

## 2021-07-19 DIAGNOSIS — R91.1 RIGHT UPPER LOBE PULMONARY NODULE: Primary | ICD-10-CM

## 2021-07-26 ENCOUNTER — HOSPITAL ENCOUNTER (OUTPATIENT)
Dept: PET IMAGING | Facility: HOSPITAL | Age: 51
Discharge: HOME | End: 2021-07-26

## 2021-07-26 DIAGNOSIS — R91.1 RIGHT UPPER LOBE PULMONARY NODULE: ICD-10-CM

## 2021-07-26 LAB — GLUCOSE BLDC GLUCOMTR-MCNC: 91 MG/DL (ref 70–130)

## 2021-07-26 PROCEDURE — 78815 PET IMAGE W/CT SKULL-THIGH: CPT

## 2021-07-26 PROCEDURE — 82962 GLUCOSE BLOOD TEST: CPT

## 2021-07-26 PROCEDURE — 0 FLUDEOXYGLUCOSE F18 SOLUTION: Performed by: INTERNAL MEDICINE

## 2021-07-26 PROCEDURE — A9552 F18 FDG: HCPCS | Performed by: INTERNAL MEDICINE

## 2021-07-26 RX ADMIN — FLUDEOXYGLUCOSE F18 1 DOSE: 300 INJECTION INTRAVENOUS at 09:38

## 2021-07-30 RX ORDER — TESTOSTERONE 20.25 MG/1.25G
GEL TOPICAL
Qty: 75 G | Refills: 6 | OUTPATIENT
Start: 2021-07-30

## 2021-08-10 ENCOUNTER — OFFICE VISIT (OUTPATIENT)
Dept: ENDOCRINOLOGY | Facility: CLINIC | Age: 51
End: 2021-08-10

## 2021-08-10 VITALS
DIASTOLIC BLOOD PRESSURE: 64 MMHG | HEIGHT: 71 IN | WEIGHT: 168 LBS | BODY MASS INDEX: 23.52 KG/M2 | RESPIRATION RATE: 16 BRPM | HEART RATE: 67 BPM | SYSTOLIC BLOOD PRESSURE: 104 MMHG | OXYGEN SATURATION: 98 %

## 2021-08-10 DIAGNOSIS — E29.1 HYPOGONADISM IN MALE: Primary | ICD-10-CM

## 2021-08-10 PROCEDURE — 99213 OFFICE O/P EST LOW 20 MIN: CPT | Performed by: INTERNAL MEDICINE

## 2021-08-10 RX ORDER — TESTOSTERONE 16.2 MG/G
GEL TRANSDERMAL
COMMUNITY
Start: 2021-02-12 | End: 2021-08-10

## 2021-08-10 RX ORDER — TESTOSTERONE 16.2 MG/G
GEL TRANSDERMAL
Qty: 150 G | Refills: 5 | Status: SHIPPED | OUTPATIENT
Start: 2021-08-10 | End: 2022-02-09

## 2021-08-10 RX ORDER — TESTOSTERONE 20.25 MG/1.25G
GEL TOPICAL
COMMUNITY
Start: 2021-06-22 | End: 2021-08-10

## 2021-08-10 NOTE — PROGRESS NOTES
"     Office Note      Date: 08/10/2021  Patient Name: Raymon Gonzalez  MRN: [id number]  : 1970    Chief Complaint   Patient presents with   • Hypogonadism       History of Present Illness:   Raymon Gonzalez is a 51 y.o. male who presents for Hypogonadism  her is here for annual follow up  He is on TRT as listed.  Spent 2 days  In the hospital for vertigo. They thought initially that he  Had a stroke.  An incidental  Pulmonary nodule was found and will be followed up.  -------------    He has not had trouble peeing.  Labs did not  Shows increased RBC's  He had annual physical to check his prostate.  He has not used his testosterone for few days.  -------------  When on his treatment he feels well with good  Libido and energy and normal sexual function     Subjective          Review of Systems:   Review of Systems   Constitutional: Negative.    HENT: Negative.    Eyes: Negative.    Respiratory: Negative.        The following portions of the patient's history were reviewed and updated as appropriate: allergies, current medications, past family history, past medical history, past social history, past surgical history and problem list.    Objective     Visit Vitals  /64   Pulse 67   Resp 16   Ht 180.3 cm (71\")   Wt 76.2 kg (168 lb)   SpO2 98%   BMI 23.43 kg/m²       Labs:    CBC w/DIFF  Lab Results   Component Value Date    WBC 5.24 2021    RBC 4.83 2021    HGB 14.6 2021    HCT 44.0 2021    MCV 91.1 2021    MCH 30.2 2021    MCHC 33.2 2021    RDW 12.8 2021    RDWSD 43.6 2021    MPV 8.6 2021     2021    NEUTRORELPCT 60.2 07/15/2021    LYMPHORELPCT 27.2 07/15/2021    MONORELPCT 10.4 07/15/2021    EOSRELPCT 1.0 07/15/2021    BASORELPCT 0.8 07/15/2021    AUTOIGPER 0.4 07/15/2021    NEUTROABS 4.82 07/15/2021    LYMPHSABS 2.17 07/15/2021    MONOSABS 0.83 07/15/2021    EOSABS 0.08 07/15/2021    BASOSABS 0.06 07/15/2021    AUTOIGNUM 0.03 " 07/15/2021    NRBC 0.0 07/15/2021       T4  No results found for: FREET4    TSH  No results found for: TSHBASE     Physical Exam:  Physical Exam  Vitals reviewed.   Constitutional:       Appearance: Normal appearance.   Skin:     Comments: No acne    Neurological:      Mental Status: He is alert.   Psychiatric:         Mood and Affect: Mood normal.         Thought Content: Thought content normal.         Judgment: Judgment normal.         Assessment / Plan      Assessment & Plan:  Problem List Items Addressed This Visit        Other    Hypogonadism in male - Primary    Current Assessment & Plan     Stable on treatment  With no evidence of complications of treatment          Relevant Medications    Testosterone 20.25 MG/ACT (1.62%) gel           Jair Chan MD   08/10/2021

## 2021-08-20 ENCOUNTER — TRANSCRIBE ORDERS (OUTPATIENT)
Dept: ADMINISTRATIVE | Facility: HOSPITAL | Age: 51
End: 2021-08-20

## 2021-08-20 DIAGNOSIS — R91.1 LUNG NODULE: Primary | ICD-10-CM

## 2021-08-20 DIAGNOSIS — R59.1 LYMPHADENOPATHY: ICD-10-CM

## 2021-11-08 ENCOUNTER — HOSPITAL ENCOUNTER (OUTPATIENT)
Dept: CT IMAGING | Facility: HOSPITAL | Age: 51
Discharge: HOME | End: 2021-11-08
Admitting: INTERNAL MEDICINE

## 2021-11-08 DIAGNOSIS — R59.1 LYMPHADENOPATHY: ICD-10-CM

## 2021-11-08 DIAGNOSIS — R91.1 LUNG NODULE: ICD-10-CM

## 2021-11-08 PROCEDURE — 70491 CT SOFT TISSUE NECK W/DYE: CPT

## 2021-11-08 PROCEDURE — 25010000002 IOPAMIDOL 61 % SOLUTION: Performed by: INTERNAL MEDICINE

## 2021-11-08 PROCEDURE — 71260 CT THORAX DX C+: CPT

## 2021-11-08 RX ADMIN — IOPAMIDOL 95 ML: 612 INJECTION, SOLUTION INTRAVENOUS at 08:47

## 2021-11-15 ENCOUNTER — TRANSCRIBE ORDERS (OUTPATIENT)
Dept: ADMINISTRATIVE | Facility: HOSPITAL | Age: 51
End: 2021-11-15

## 2021-11-15 DIAGNOSIS — R91.1 LUNG NODULE: Primary | ICD-10-CM

## 2022-02-09 DIAGNOSIS — E29.1 HYPOGONADISM IN MALE: ICD-10-CM

## 2022-02-09 RX ORDER — TESTOSTERONE 20.25 MG/1.25G
GEL TOPICAL
Qty: 150 G | Refills: 5 | Status: SHIPPED | OUTPATIENT
Start: 2022-02-09 | End: 2022-08-17 | Stop reason: SDUPTHER

## 2022-08-14 DIAGNOSIS — E29.1 HYPOGONADISM IN MALE: ICD-10-CM

## 2022-08-15 ENCOUNTER — TELEPHONE (OUTPATIENT)
Dept: ENDOCRINOLOGY | Facility: CLINIC | Age: 52
End: 2022-08-15

## 2022-08-15 RX ORDER — TESTOSTERONE 20.25 MG/1.25G
GEL TOPICAL
Qty: 150 G | OUTPATIENT
Start: 2022-08-15

## 2022-08-17 DIAGNOSIS — E29.1 HYPOGONADISM IN MALE: ICD-10-CM

## 2022-08-17 RX ORDER — TESTOSTERONE 20.25 MG/1.25G
GEL TOPICAL
Qty: 75 G | Refills: 0 | Status: SHIPPED | OUTPATIENT
Start: 2022-08-17 | End: 2022-08-24 | Stop reason: SDUPTHER

## 2022-08-23 ENCOUNTER — OFFICE VISIT (OUTPATIENT)
Dept: ENDOCRINOLOGY | Facility: CLINIC | Age: 52
End: 2022-08-23

## 2022-08-23 ENCOUNTER — LAB (OUTPATIENT)
Dept: LAB | Facility: HOSPITAL | Age: 52
End: 2022-08-23

## 2022-08-23 VITALS
WEIGHT: 170 LBS | BODY MASS INDEX: 23.8 KG/M2 | DIASTOLIC BLOOD PRESSURE: 78 MMHG | HEART RATE: 70 BPM | OXYGEN SATURATION: 98 % | HEIGHT: 71 IN | SYSTOLIC BLOOD PRESSURE: 124 MMHG

## 2022-08-23 DIAGNOSIS — E29.1 HYPOGONADISM IN MALE: Primary | ICD-10-CM

## 2022-08-23 LAB
DEPRECATED RDW RBC AUTO: 38.7 FL (ref 37–54)
ERYTHROCYTE [DISTWIDTH] IN BLOOD BY AUTOMATED COUNT: 12.6 % (ref 12.3–15.4)
HCT VFR BLD AUTO: 42.3 % (ref 37.5–51)
HGB BLD-MCNC: 14.5 G/DL (ref 13–17.7)
MCH RBC QN AUTO: 29.7 PG (ref 26.6–33)
MCHC RBC AUTO-ENTMCNC: 34.3 G/DL (ref 31.5–35.7)
MCV RBC AUTO: 86.7 FL (ref 79–97)
PLATELET # BLD AUTO: 319 10*3/MM3 (ref 140–450)
PMV BLD AUTO: 9.2 FL (ref 6–12)
RBC # BLD AUTO: 4.88 10*6/MM3 (ref 4.14–5.8)
TESTOST SERPL-MCNC: 351 NG/DL (ref 193–740)
WBC NRBC COR # BLD: 5.99 10*3/MM3 (ref 3.4–10.8)

## 2022-08-23 PROCEDURE — 84403 ASSAY OF TOTAL TESTOSTERONE: CPT | Performed by: INTERNAL MEDICINE

## 2022-08-23 PROCEDURE — 36415 COLL VENOUS BLD VENIPUNCTURE: CPT | Performed by: INTERNAL MEDICINE

## 2022-08-23 PROCEDURE — 85027 COMPLETE CBC AUTOMATED: CPT | Performed by: INTERNAL MEDICINE

## 2022-08-23 PROCEDURE — 99213 OFFICE O/P EST LOW 20 MIN: CPT | Performed by: INTERNAL MEDICINE

## 2022-08-23 RX ORDER — BUPROPION HYDROCHLORIDE 300 MG/1
300 TABLET ORAL EVERY MORNING
COMMUNITY
Start: 2022-08-17

## 2022-08-23 NOTE — PROGRESS NOTES
"     Office Note      Date: 2022  Patient Name: Raymon Gonzalez  MRN: [id number]  : 1970    Chief Complaint   Patient presents with   • Hypogonadism       History of Present Illness:   Raymon Gonzalez is a 52 y.o. male who presents for Hypogonadism  USING  ANDROGEL 3 PUMPS PER DAY  HERE FOR ANNUAL FOLLOW UP   ---------------  REPORTS NO CHANGE TO HIS MEDICAL HISTORY  ---------------  HE HAS NO QUESTIONS OR PROBLEMS TO MENTION TODAY  ------------------------  NO TROUBLE PEEING.  NO ACNE.  ENERGY- FAIR  LIBID0- GOOD  NO E.D.    SEES DR SCHULTZ FOR ANNUAL PHYSICAL     HAS BEEN A COUPLE DAYS WITHOUT HIS TESTOSTERONE     Subjective        Review of Systems:   Review of Systems   Constitutional: Positive for fatigue.       The following portions of the patient's history were reviewed and updated as appropriate: allergies, current medications, past family history, past medical history, past social history, past surgical history and problem list.    Objective     Visit Vitals  /78   Pulse 70   Ht 180.3 cm (71\")   Wt 77.1 kg (170 lb)   SpO2 98%   BMI 23.71 kg/m²       Labs:    CBC w/DIFF  Lab Results   Component Value Date    WBC 5.24 2021    RBC 4.83 2021    HGB 14.6 2021    HCT 44.0 2021    MCV 91.1 2021    MCH 30.2 2021    MCHC 33.2 2021    RDW 12.8 2021    RDWSD 43.6 2021    MPV 8.6 2021     2021    NEUTRORELPCT 60.2 07/15/2021    LYMPHORELPCT 27.2 07/15/2021    MONORELPCT 10.4 07/15/2021    EOSRELPCT 1.0 07/15/2021    BASORELPCT 0.8 07/15/2021    AUTOIGPER 0.4 07/15/2021    NEUTROABS 4.82 07/15/2021    LYMPHSABS 2.17 07/15/2021    MONOSABS 0.83 07/15/2021    EOSABS 0.08 07/15/2021    BASOSABS 0.06 07/15/2021    AUTOIGNUM 0.03 07/15/2021    NRBC 0.0 07/15/2021       T4  No results found for: FREET4    TSH  No results found for: TSHBASE     Physical Exam:  Physical Exam  Vitals reviewed.   Constitutional:       Appearance: Normal " appearance.   Neurological:      Mental Status: He is alert.   Psychiatric:         Mood and Affect: Mood normal.         Thought Content: Thought content normal.         Judgment: Judgment normal.         Assessment / Plan      Assessment & Plan:  Problem List Items Addressed This Visit        Other    Hypogonadism in male - Primary    Current Assessment & Plan     STABLE ON TREATMENT  WILL CHECK CBC AND TESTO AND ADJUST AS INDICATION          Relevant Medications    Testosterone 1.62 % gel    Other Relevant Orders    Testosterone    CBC (No Diff)           Jair Chan MD   08/23/2022

## 2022-08-24 DIAGNOSIS — E29.1 HYPOGONADISM IN MALE: ICD-10-CM

## 2022-08-25 DIAGNOSIS — E29.1 HYPOGONADISM IN MALE: ICD-10-CM

## 2022-08-25 RX ORDER — TESTOSTERONE 20.25 MG/1.25G
GEL TOPICAL
Qty: 75 G | Refills: 0 | Status: SHIPPED | OUTPATIENT
Start: 2022-08-25 | End: 2022-08-25 | Stop reason: SDUPTHER

## 2022-08-25 RX ORDER — TESTOSTERONE 20.25 MG/1.25G
GEL TOPICAL
Qty: 300 G | Refills: 3 | Status: SHIPPED | OUTPATIENT
Start: 2022-08-25 | End: 2023-03-27

## 2022-08-25 NOTE — TELEPHONE ENCOUNTER
Last OV 8/23/22 Future appt 8/23/23  Josias updated in chart 2/7/22 Felipe refilled this on 8/17/22

## 2022-08-26 ENCOUNTER — PRIOR AUTHORIZATION (OUTPATIENT)
Dept: ENDOCRINOLOGY | Facility: CLINIC | Age: 52
End: 2022-08-26

## 2022-08-26 NOTE — TELEPHONE ENCOUNTER
Raymon Gonzalez Key: [id number] - PA Case ID: [id number] - Rx #: [id number] help? Call us at [phone]  Outcome  Approvedon August 25  PA Case: [id number], Status: Approved, Coverage Starts on: 8/25/2022 12:00:00 AM, Coverage Ends on: 8/24/2024 12:00:00 AM. Questions? Contact [phone].  Drug  Testosterone 1.62% gel  Form  [employer] Electronic PA Form

## 2022-11-14 ENCOUNTER — APPOINTMENT (OUTPATIENT)
Dept: CT IMAGING | Facility: HOSPITAL | Age: 52
End: 2022-11-14

## 2022-11-23 ENCOUNTER — TRANSCRIBE ORDERS (OUTPATIENT)
Dept: ADMINISTRATIVE | Facility: HOSPITAL | Age: 52
End: 2022-11-23

## 2022-11-23 DIAGNOSIS — R91.1 LUNG NODULE: Primary | ICD-10-CM

## 2022-12-30 ENCOUNTER — HOSPITAL ENCOUNTER (OUTPATIENT)
Dept: CT IMAGING | Facility: HOSPITAL | Age: 52
Discharge: HOME | End: 2022-12-30
Admitting: INTERNAL MEDICINE

## 2022-12-30 DIAGNOSIS — R91.1 LUNG NODULE: ICD-10-CM

## 2022-12-30 PROCEDURE — 71250 CT THORAX DX C-: CPT

## 2023-03-26 DIAGNOSIS — E29.1 HYPOGONADISM IN MALE: ICD-10-CM

## 2023-03-27 RX ORDER — TESTOSTERONE 20.25 MG/1.25G
GEL TOPICAL
Qty: 300 G | Refills: 3 | Status: SHIPPED | OUTPATIENT
Start: 2023-03-27

## 2023-05-18 ENCOUNTER — PRE-PROCEDURE SCREENING (OUTPATIENT)
Dept: GASTROENTEROLOGY | Facility: CLINIC | Age: 53
End: 2023-05-18
Payer: COMMERCIAL

## 2023-05-18 NOTE — TELEPHONE ENCOUNTER
LAST SCOPE 9/20 IN Epic --Personal history of polyps--Family  history  of polyps AND  colon cancer--No blood thinners--Medications:                  PARoxetine (PAXIL) 20 MG tablet    buPROPion XL (WELLBUTRIN XL) 300 MG 24 hr tablet    QUESTIONNAIRE SCREENING  SCAN IN  MEDIA & HAS BEEN SENT TO DOCTOR FOR REVIEW

## 2023-05-22 ENCOUNTER — PREP FOR SURGERY (OUTPATIENT)
Dept: OTHER | Facility: HOSPITAL | Age: 53
End: 2023-05-22
Payer: COMMERCIAL

## 2023-05-22 DIAGNOSIS — Z86.010 PERSONAL HISTORY OF COLONIC POLYPS: Primary | ICD-10-CM

## 2023-05-22 RX ORDER — SODIUM CHLORIDE, SODIUM LACTATE, POTASSIUM CHLORIDE, CALCIUM CHLORIDE 600; 310; 30; 20 MG/100ML; MG/100ML; MG/100ML; MG/100ML
30 INJECTION, SOLUTION INTRAVENOUS CONTINUOUS
OUTPATIENT
Start: 2023-05-22

## 2023-08-23 DIAGNOSIS — E29.1 HYPOGONADISM IN MALE: ICD-10-CM

## 2023-08-23 RX ORDER — TESTOSTERONE 20.25 MG/1.25G
GEL TOPICAL
Qty: 300 G | Refills: 3 | Status: SHIPPED | OUTPATIENT
Start: 2023-08-23

## 2023-08-23 NOTE — TELEPHONE ENCOUNTER
Rx Refill Note  Requested Prescriptions     Pending Prescriptions Disp Refills    Testosterone 1.62 % gel 300 g 3     Sig: APPLY 3 PUMPS TOPICALLY TO THE AFFECTED AREA DAILY          Last office visit with prescribing clinician: 8/23/2022     Next office visit with prescribing clinician: 1/12/2024         Cheri Jaime MA  08/23/23, 11:30 EDT

## 2023-10-03 ENCOUNTER — TELEPHONE (OUTPATIENT)
Dept: GASTROENTEROLOGY | Facility: CLINIC | Age: 53
End: 2023-10-03
Payer: COMMERCIAL

## 2023-10-03 PROBLEM — Z86.010 PERSONAL HISTORY OF COLONIC POLYPS: Status: ACTIVE | Noted: 2023-10-03

## 2023-10-03 PROBLEM — Z86.0100 PERSONAL HISTORY OF COLONIC POLYPS: Status: ACTIVE | Noted: 2023-10-03

## 2023-10-03 NOTE — TELEPHONE ENCOUNTER
Caller: Raymon Gonzalez    Relationship to patient: Self    Best call back number:[phone]       Type of visit: COLONOSCOPY    Requested date: ANY    If rescheduling, when is the original appointment: N/A    Additional notes: 3 YEAR RECALL

## 2023-10-03 NOTE — TELEPHONE ENCOUNTER
OZZY SOLO PT SCHEDULED 10/26/2023 @[facility].. MIRALAX PREP PACKET SENT TO ADDRESS ON FILE VERIFIED BY PT...OK FOR HUB TO READ

## 2023-10-03 NOTE — TELEPHONE ENCOUNTER
OZZY SOLO PT SCHEDULED 10/26/2023 @[facility].. MIRALAX PREP PACKET SENT TO ADDRESS ON FILE VERIFIED BY PT... OK [facility] HUB TO READ

## 2023-10-03 NOTE — TELEPHONE ENCOUNTER
She has order from may     Caller: Raymon Gonzalez    Relationship to patient: Self    Best call back number:[phone]       Type of visit: COLONOSCOPY    Requested date: ANY    If rescheduling, when is the original appointment: N/A    Additional notes: 3 YEAR RECALL

## 2023-10-04 NOTE — TELEPHONE ENCOUNTER
: OZZY SOLO PT SCHEDULED 10/26/2023 @[facility].. MIRALAX PREP PACKET SENT TO ADDRESS ON FILE VERIFIED BY PT...

## 2023-12-06 ENCOUNTER — TELEPHONE (OUTPATIENT)
Dept: GASTROENTEROLOGY | Facility: CLINIC | Age: 53
End: 2023-12-06
Payer: COMMERCIAL

## 2023-12-06 DIAGNOSIS — Z12.11 ENCOUNTER FOR SCREENING FOR MALIGNANT NEOPLASM OF COLON: Primary | ICD-10-CM

## 2023-12-06 NOTE — TELEPHONE ENCOUNTER
Caller: Raymon Gonzalez    Relationship to patient: Self    Best call back number: [phone]     Chief complaint: N/A    Type of visit: COLONOSCOPY    Requested date: ANY      If rescheduling, when is the original appointment: 10/26/23     Additional notes: PATIENT HAS NOT RECEIVED A CALL TO RESCHEDULE.

## 2023-12-06 NOTE — TELEPHONE ENCOUNTER
Caller: Raymon Gonzalez    Relationship to patient: Self    Best call back number: [phone]     Chief complaint: N/A    Type of visit: COLONOSCOPY    Requested date: ANY      If rescheduling, when is the original appointment: 10/26/23     Additional notes: PATIENT HAS NOT RECEIVED A CALL TO RESCHEDULE.

## 2023-12-11 ENCOUNTER — TELEPHONE (OUTPATIENT)
Dept: GASTROENTEROLOGY | Facility: CLINIC | Age: 53
End: 2023-12-11
Payer: COMMERCIAL

## 2023-12-12 ENCOUNTER — TELEPHONE (OUTPATIENT)
Dept: GASTROENTEROLOGY | Facility: CLINIC | Age: 53
End: 2023-12-12
Payer: COMMERCIAL

## 2023-12-12 NOTE — TELEPHONE ENCOUNTER
COLONOSCOPY on 4/9/2024  arrive at  9:00 . Sent prep instructions to pt mail address ....miralax

## 2024-01-12 ENCOUNTER — OFFICE VISIT (OUTPATIENT)
Dept: ENDOCRINOLOGY | Facility: CLINIC | Age: 54
End: 2024-01-12
Payer: COMMERCIAL

## 2024-01-12 VITALS
WEIGHT: 174 LBS | DIASTOLIC BLOOD PRESSURE: 66 MMHG | HEIGHT: 71 IN | HEART RATE: 67 BPM | BODY MASS INDEX: 24.36 KG/M2 | OXYGEN SATURATION: 99 % | SYSTOLIC BLOOD PRESSURE: 108 MMHG

## 2024-01-12 DIAGNOSIS — E29.1 HYPOGONADISM IN MALE: Primary | ICD-10-CM

## 2024-01-12 LAB
DEPRECATED RDW RBC AUTO: 42 FL (ref 37–54)
ERYTHROCYTE [DISTWIDTH] IN BLOOD BY AUTOMATED COUNT: 12.7 % (ref 12.3–15.4)
HCT VFR BLD AUTO: 41.6 % (ref 37.5–51)
HGB BLD-MCNC: 13.9 G/DL (ref 13–17.7)
MCH RBC QN AUTO: 30.3 PG (ref 26.6–33)
MCHC RBC AUTO-ENTMCNC: 33.4 G/DL (ref 31.5–35.7)
MCV RBC AUTO: 90.6 FL (ref 79–97)
PLATELET # BLD AUTO: 352 10*3/MM3 (ref 140–450)
PMV BLD AUTO: 9.4 FL (ref 6–12)
RBC # BLD AUTO: 4.59 10*6/MM3 (ref 4.14–5.8)
WBC NRBC COR # BLD AUTO: 7.09 10*3/MM3 (ref 3.4–10.8)

## 2024-01-12 PROCEDURE — 36415 COLL VENOUS BLD VENIPUNCTURE: CPT | Performed by: INTERNAL MEDICINE

## 2024-01-12 PROCEDURE — 85027 COMPLETE CBC AUTOMATED: CPT | Performed by: INTERNAL MEDICINE

## 2024-01-12 PROCEDURE — 99213 OFFICE O/P EST LOW 20 MIN: CPT | Performed by: INTERNAL MEDICINE

## 2024-01-12 PROCEDURE — 84403 ASSAY OF TOTAL TESTOSTERONE: CPT | Performed by: INTERNAL MEDICINE

## 2024-01-12 RX ORDER — TESTOSTERONE 20.25 MG/1.25G
GEL TOPICAL
Qty: 300 G | Refills: 3 | Status: SHIPPED | OUTPATIENT
Start: 2024-01-12

## 2024-01-12 NOTE — PROGRESS NOTES
"     Office Note      Date: 2024  Patient Name: Raymon Gonzalez  MRN: [id number]  : 1970    Chief Complaint   Patient presents with    Abnormal Lab     Hypogonadism in male         History of Present Illness:   Raymon Gonzalez is a 53 y.o. male who presents for Abnormal Lab (Hypogonadism in male/)  .   Current rx: topical testosterone gel   It has been aboutg 18 months since his last visit     Changes in history:none   Questions /problems:none. Things have been just going along.  He gets his annual physical and prostate check with dr howe. Cbc is done there     Subjective          Review of Systems:   Review of Systems   Constitutional:  Negative for fatigue and unexpected weight change.   Genitourinary:  Negative for difficulty urinating.   Skin:         No acne   Psychiatric/Behavioral:  Negative for agitation.        The following portions of the patient's history were reviewed and updated as appropriate: allergies, current medications, past family history, past medical history, past social history, past surgical history, and problem list.    Objective     Visit Vitals  /66 (BP Location: Left arm, Patient Position: Sitting, Cuff Size: Adult)   Pulse 67   Ht 180.3 cm (71\")   Wt 78.9 kg (174 lb)   SpO2 99%   BMI 24.27 kg/m²           Physical Exam:  Physical Exam  Vitals reviewed.   Constitutional:       Appearance: Normal appearance.   HENT:      Head: Normocephalic and atraumatic.   Neurological:      Mental Status: He is alert.   Psychiatric:         Mood and Affect: Mood normal.         Behavior: Behavior normal.         Thought Content: Thought content normal.         Judgment: Judgment normal.         Assessment / Plan      Assessment & Plan:  Problem List Items Addressed This Visit          Other    Hypogonadism in male - Primary    Current Assessment & Plan     Clinically stable  Will check testo and cbc          Relevant Medications    Testosterone 1.62 % gel    Other Relevant Orders    " CBC (No Diff)    Testosterone        Electronically signed by : Jair Chan MD   01/12/2024

## 2024-01-13 LAB — TESTOST SERPL-MCNC: 306 NG/DL (ref 193–740)

## 2024-01-16 ENCOUNTER — PRIOR AUTHORIZATION (OUTPATIENT)
Dept: ENDOCRINOLOGY | Facility: CLINIC | Age: 54
End: 2024-01-16
Payer: COMMERCIAL

## 2024-01-19 NOTE — TELEPHONE ENCOUNTER
Raymon Gonzalez (Key: [id number])  PA Case ID #: [id number]  Need Help? Call us at [phone]  Outcome  Approved on January 17  Request Reference Number: [id number]. TESTOSTERONE GEL 1.62% is approved through 01/17/2025. Your patient may now fill this prescription and it will be covered.  Authorization Expiration Date: 1/17/2025  Drug  Testosterone 1.62% gel  ePA cloud logo  Form  [employer] Electronic Prior Authorization Form (2017

## 2024-04-09 ENCOUNTER — TELEPHONE (OUTPATIENT)
Dept: GASTROENTEROLOGY | Facility: CLINIC | Age: 54
End: 2024-04-09
Payer: COMMERCIAL

## 2024-04-09 NOTE — TELEPHONE ENCOUNTER
Email sent to pancho  for COLONOSCOPY on 5/3/2024  arrive at 10;30  . Sent prep instructions to pt my chart....miralax     Golytely prep faxed to pt pharmacy

## 2024-04-09 NOTE — TELEPHONE ENCOUNTER
Hub staff attempted to follow warm transfer process and was unsuccessful     Caller: Raymon Gonzalez    Relationship to patient: Self    Best call back number: [phone]    Patient is needing: PT HAS CLS 9AM TODAY AND DO NOT THINK CLEANED OUT COMPLETELY.  PLEASE CALL TO ADVISE WHAT CAN BE DONE.

## 2024-04-11 ENCOUNTER — TELEPHONE (OUTPATIENT)
Dept: GASTROENTEROLOGY | Facility: CLINIC | Age: 54
End: 2024-04-11

## 2024-04-17 ENCOUNTER — TELEPHONE (OUTPATIENT)
Dept: GASTROENTEROLOGY | Facility: CLINIC | Age: 54
End: 2024-04-17
Payer: COMMERCIAL

## 2024-04-17 NOTE — TELEPHONE ENCOUNTER
----- Message from Raymon Gonzalez sent at 4/17/2024  9:22 AM EDT -----  Regarding: Rescheduling   Contact: [phone]  Hello.  Can someone call me to reschedule a colonoscopy?  I’ve been calling and it just rings and rings.      Raymon Gonzalez - [phone]

## 2024-05-02 DIAGNOSIS — E29.1 HYPOGONADISM IN MALE: Primary | ICD-10-CM

## 2024-05-02 NOTE — TELEPHONE ENCOUNTER
PATIENT RETURNED CALL. PATIENT IS OK WITH TESTIM RX. PLEASE SEND TO [facility]

## 2024-05-03 RX ORDER — TESTOSTERONE GEL, 1% 10 MG/G
50 GEL TRANSDERMAL DAILY
Qty: 30 EACH | Refills: 5 | Status: SHIPPED | OUTPATIENT
Start: 2024-05-03

## 2024-07-24 ENCOUNTER — TELEPHONE (OUTPATIENT)
Dept: GASTROENTEROLOGY | Facility: CLINIC | Age: 54
End: 2024-07-24
Payer: COMMERCIAL

## 2024-07-24 DIAGNOSIS — Z12.11 ENCOUNTER FOR SCREENING FOR MALIGNANT NEOPLASM OF COLON: Primary | ICD-10-CM

## 2024-07-24 NOTE — TELEPHONE ENCOUNTER
PT CALLED AT 0900AM WITH CONCERNS WITH PREP AND STOOL CONSISTENCY , WANTED TO BE ADVISED IF HE SHOULD STILL PROCEED WITH PROCEDURE ,OZZY BENAVIDES AT [facility] COMMUNICATED PT CONCERNS AND  IF HE SHOULD STILL PROCEED TO HAVE PROCEDURE I WAS TRANSFERRED TO PRE-OP NURSE ONESIMO  WHOM COMMUNICATED TO SEND PT OVER TO HAVE PROCEDURE.OK FOR HUB TO RELAY

## 2024-07-24 NOTE — TELEPHONE ENCOUNTER
OZZY SCRUGGS  SHE COMMUNICATED  PT SCHEDULED BY PRE-OP NURSE ONESIMO TO ARRIVE AT 8:30 FOR 10:00AM PROCEDURE,OZZY PT WENT OVER MIRALAX BOWEL PREP INFORMATION STEP BY STEP AND COMMUNICATED PER DR FLOYD ONLY  DO HALF THE PREP TO BE TAKEN AT 8PM (28-32OZ GATORADE AND 8.3OZ MIRALAX),PT VERBALIZES UNDERSTANDING COMMUNICATED TO CALL IF HE HAD ANY OTHER QUESTIONS OR CONCERNS,OK FOR HUB TO RELAY

## 2024-07-24 NOTE — TELEPHONE ENCOUNTER
Patient called into the office. He states he is passing liquid brown water. He questions if he still needs to have his colonoscopy today?     Jazmin() called [facility] and verbal order from Dr. Gomez: have the patient move forward with his colonoscopy.   Patient advised of the above and he verb understanding.

## 2024-07-25 ENCOUNTER — ANESTHESIA EVENT (OUTPATIENT)
Dept: SURGERY | Facility: SURGERY CENTER | Age: 54
End: 2024-07-25
Payer: COMMERCIAL

## 2024-07-25 ENCOUNTER — HOSPITAL ENCOUNTER (OUTPATIENT)
Facility: SURGERY CENTER | Age: 54
Setting detail: HOSPITAL OUTPATIENT SURGERY
Discharge: HOME | End: 2024-07-25
Attending: INTERNAL MEDICINE | Admitting: INTERNAL MEDICINE
Payer: COMMERCIAL

## 2024-07-25 ENCOUNTER — ANESTHESIA (OUTPATIENT)
Dept: SURGERY | Facility: SURGERY CENTER | Age: 54
End: 2024-07-25
Payer: COMMERCIAL

## 2024-07-25 VITALS
BODY MASS INDEX: 23.01 KG/M2 | RESPIRATION RATE: 20 BRPM | OXYGEN SATURATION: 98 % | SYSTOLIC BLOOD PRESSURE: 106 MMHG | TEMPERATURE: 97.3 F | DIASTOLIC BLOOD PRESSURE: 70 MMHG | HEART RATE: 67 BPM | WEIGHT: 165 LBS

## 2024-07-25 PROCEDURE — 25010000002 PROPOFOL 1000 MG/100ML EMULSION

## 2024-07-25 PROCEDURE — 45378 DIAGNOSTIC COLONOSCOPY: CPT | Performed by: INTERNAL MEDICINE

## 2024-07-25 PROCEDURE — 25010000002 LIDOCAINE 1 % SOLUTION

## 2024-07-25 PROCEDURE — 25810000003 LACTATED RINGERS PER 1000 ML: Performed by: INTERNAL MEDICINE

## 2024-07-25 PROCEDURE — 25010000002 PROPOFOL 10 MG/ML EMULSION

## 2024-07-25 RX ORDER — SODIUM CHLORIDE 0.9 % (FLUSH) 0.9 %
10 SYRINGE (ML) INJECTION AS NEEDED
Status: DISCONTINUED | OUTPATIENT
Start: 2024-07-25 | End: 2024-07-25 | Stop reason: HOSPADM

## 2024-07-25 RX ORDER — LIDOCAINE HYDROCHLORIDE 10 MG/ML
INJECTION, SOLUTION INFILTRATION; PERINEURAL AS NEEDED
Status: DISCONTINUED | OUTPATIENT
Start: 2024-07-25 | End: 2024-07-25 | Stop reason: SURG

## 2024-07-25 RX ORDER — PROPOFOL 10 MG/ML
INJECTION, EMULSION INTRAVENOUS AS NEEDED
Status: DISCONTINUED | OUTPATIENT
Start: 2024-07-25 | End: 2024-07-25 | Stop reason: SURG

## 2024-07-25 RX ORDER — LIDOCAINE HYDROCHLORIDE 10 MG/ML
0.5 INJECTION, SOLUTION INFILTRATION; PERINEURAL ONCE AS NEEDED
Status: DISCONTINUED | OUTPATIENT
Start: 2024-07-25 | End: 2024-07-25 | Stop reason: HOSPADM

## 2024-07-25 RX ORDER — SODIUM CHLORIDE, SODIUM LACTATE, POTASSIUM CHLORIDE, CALCIUM CHLORIDE 600; 310; 30; 20 MG/100ML; MG/100ML; MG/100ML; MG/100ML
1000 INJECTION, SOLUTION INTRAVENOUS CONTINUOUS
Status: DISCONTINUED | OUTPATIENT
Start: 2024-07-25 | End: 2024-07-25 | Stop reason: HOSPADM

## 2024-07-25 RX ADMIN — SODIUM CHLORIDE, POTASSIUM CHLORIDE, SODIUM LACTATE AND CALCIUM CHLORIDE: 600; 310; 30; 20 INJECTION, SOLUTION INTRAVENOUS at 09:36

## 2024-07-25 RX ADMIN — LIDOCAINE HYDROCHLORIDE 50 MG: 10 INJECTION, SOLUTION INFILTRATION; PERINEURAL at 09:38

## 2024-07-25 RX ADMIN — PROPOFOL 100 MG: 10 INJECTION, EMULSION INTRAVENOUS at 09:38

## 2024-07-25 RX ADMIN — PROPOFOL 250 MCG/KG/MIN: 10 INJECTION, EMULSION INTRAVENOUS at 09:38

## 2024-07-25 NOTE — ANESTHESIA PREPROCEDURE EVALUATION
Anesthesia Evaluation     Patient summary reviewed and Nursing notes reviewed   NPO Solid Status: > 6 hours  NPO Liquid Status: > 2 hours           Airway   Mallampati: II  TM distance: >3 FB  Neck ROM: full  Dental - normal exam     Pulmonary    (-) COPD, asthma, not a smoker  Cardiovascular   Exercise tolerance: good (4-7 METS)    ECG reviewed    (-) past MI, dysrhythmias, angina      Neuro/Psych  (+) psychiatric history Anxiety and Depression  (-) seizures, CVA  GI/Hepatic/Renal/Endo    (-) GERD, liver disease, no renal disease, diabetes, no thyroid disorder    Musculoskeletal     Abdominal    Substance History      OB/GYN          Other                          Anesthesia Plan    ASA 2     MAC       Anesthetic plan, risks, benefits, and alternatives have been provided, discussed and informed consent has been obtained with: patient.        CODE STATUS:

## 2024-07-25 NOTE — ANESTHESIA POSTPROCEDURE EVALUATION
Patient: Raymon Chekoby    Procedure Summary       Date: 07/25/24 Room / Location: [facility] OR  / [facility] MAIN OR    Anesthesia Start: 0936 Anesthesia Stop: 1006    Procedure: COLONOSCOPY TO CECUM Diagnosis:       Encounter for screening for malignant neoplasm of colon      (Encounter for screening for malignant neoplasm of colon [Z12.11])    Surgeons: Dennis Gomez MD Provider: Mike Jaffe MD    Anesthesia Type: MAC ASA Status: 2            Anesthesia Type: MAC    Vitals  Vitals Value Taken Time   /70 07/25/24 1025   Temp 36.3 °C (97.3 °F) 07/25/24 1008   Pulse 67 07/25/24 1025   Resp 20 07/25/24 1025   SpO2 98 % 07/25/24 1025           Post Anesthesia Care and Evaluation    Patient location during evaluation: PHASE II  Patient participation: complete - patient participated  Level of consciousness: awake  Pain management: satisfactory to patient    Airway patency: patent  Anesthetic complications: No anesthetic complications  PONV Status: controlled  Cardiovascular status: acceptable  Respiratory status: acceptable  Hydration status: acceptable

## 2024-07-25 NOTE — H&P
[facility]  Pre Procedure History & Physical    Chief Complaint:   Time for my colonoscopy    Subjective     HPI:   54 y.o. male presenting to endoscopy unit today for surveillance colonoscopy.    Past Medical History:   Past Medical History:   Diagnosis Date    Anxiety     Depression     IBS (irritable bowel syndrome)     Testicular hypofunction     Testosterone deficiency 2008       Family History:  Family History   Problem Relation Age of Onset    Malig Hyperthermia Neg Hx        Social History:   reports that he has never smoked. He does not have any smokeless tobacco history on file. He reports current alcohol use of about 2.0 standard drinks of alcohol per week. He reports that he does not use drugs.    Medications:   Medications Prior to Admission   Medication Sig Dispense Refill Last Dose    buPROPion XL (WELLBUTRIN XL) 300 MG 24 hr tablet Take 1 tablet by mouth Every Morning.       PARoxetine (PAXIL) 20 MG tablet Take 1 tablet by mouth Every Morning.       testosterone (Testim) 50 MG/5GM (1%) gel gel Place 50 mg on the skin as directed by provider Daily. 30 each 5        Allergies:  Penicillins    Objective     There were no vitals taken for this visit.  Physical Exam:   General: patient awake, alert and cooperative    Assessment & Plan     Diagnosis:  Personal hx of colon polyps    Anticipated Surgical Procedure:  Colonoscopy    The risks, benefits, and alternatives of this procedure have been discussed with the patient or the responsible party- the patient understands and agrees to proceed.

## 2024-12-02 DIAGNOSIS — E29.1 HYPOGONADISM IN MALE: ICD-10-CM

## 2024-12-03 RX ORDER — TESTOSTERONE 50 MG/5G
GEL TRANSDERMAL
Qty: 150 G | Refills: 0 | Status: SHIPPED | OUTPATIENT
Start: 2024-12-03

## 2024-12-03 NOTE — TELEPHONE ENCOUNTER
Rx Refill Note  Requested Prescriptions     Pending Prescriptions Disp Refills    Testim 50 MG/5GM (1%) gel gel [Pharmacy Med Name: TESTIM 1% GEL 5GM] 150 g      Sig: PLACE 5 GRAMS ON THE SKIN AS DIRECTED BY PROVIDER DAILY      Last office visit with prescribing clinician: 1/12/2024      Next office visit with prescribing clinician: 1/13/2025       Dianna Arzola MA  12/03/24, 10:07 EST

## 2025-01-13 ENCOUNTER — OFFICE VISIT (OUTPATIENT)
Age: 55
End: 2025-01-13
Payer: COMMERCIAL

## 2025-01-13 VITALS
DIASTOLIC BLOOD PRESSURE: 82 MMHG | WEIGHT: 174.3 LBS | SYSTOLIC BLOOD PRESSURE: 126 MMHG | HEIGHT: 71 IN | HEART RATE: 68 BPM | OXYGEN SATURATION: 98 % | BODY MASS INDEX: 24.4 KG/M2

## 2025-01-13 DIAGNOSIS — E29.1 HYPOGONADISM IN MALE: Primary | ICD-10-CM

## 2025-01-13 DIAGNOSIS — Z12.5 SPECIAL SCREENING FOR MALIGNANT NEOPLASM OF PROSTATE: ICD-10-CM

## 2025-01-13 LAB
DEPRECATED RDW RBC AUTO: 40.6 FL (ref 37–54)
ERYTHROCYTE [DISTWIDTH] IN BLOOD BY AUTOMATED COUNT: 12.5 % (ref 12.3–15.4)
HCT VFR BLD AUTO: 42.9 % (ref 37.5–51)
HGB BLD-MCNC: 13.9 G/DL (ref 13–17.7)
MCH RBC QN AUTO: 28.9 PG (ref 26.6–33)
MCHC RBC AUTO-ENTMCNC: 32.4 G/DL (ref 31.5–35.7)
MCV RBC AUTO: 89.2 FL (ref 79–97)
PLATELET # BLD AUTO: 383 10*3/MM3 (ref 140–450)
PMV BLD AUTO: 9.5 FL (ref 6–12)
RBC # BLD AUTO: 4.81 10*6/MM3 (ref 4.14–5.8)
WBC NRBC COR # BLD AUTO: 6.17 10*3/MM3 (ref 3.4–10.8)

## 2025-01-13 PROCEDURE — 99213 OFFICE O/P EST LOW 20 MIN: CPT | Performed by: INTERNAL MEDICINE

## 2025-01-13 PROCEDURE — 85027 COMPLETE CBC AUTOMATED: CPT | Performed by: INTERNAL MEDICINE

## 2025-01-13 PROCEDURE — 84403 ASSAY OF TOTAL TESTOSTERONE: CPT | Performed by: INTERNAL MEDICINE

## 2025-01-13 PROCEDURE — G0103 PSA SCREENING: HCPCS | Performed by: INTERNAL MEDICINE

## 2025-01-13 NOTE — PROGRESS NOTES
"     Office Note      Date: 2025  Patient Name: Raymon Gonzalez  MRN: [id number]  : 1970    Chief Complaint   Patient presents with    Hypogonadism in male       History of Present Illness:   Raymon Gonzalez is a 54 y.o. male who presents for Hypogonadism in male  .   Current rx: TOPICAL TESTO    Changes in history: NONE  Questions /problems:FEELS LIKE OUT TREATMENT IS WORKING    Subjective          Review of Systems:   Review of Systems   Constitutional:         ENERGY LEVEL IS FAIR TO POOR    Genitourinary:  Positive for difficulty urinating.        NO ISSUES WITH LIBIDO OR ED   Skin:         NO ACNE       The following portions of the patient's history were reviewed and updated as appropriate: allergies, current medications, past family history, past medical history, past social history, past surgical history, and problem list.    Objective     Visit Vitals  /82 (BP Location: Left arm, Patient Position: Sitting)   Pulse 68   Ht 180.3 cm (71\")   Wt 79.1 kg (174 lb 4.8 oz)   SpO2 98%   BMI 24.31 kg/m²           Physical Exam:  Physical Exam  Vitals reviewed.   Constitutional:       Appearance: Normal appearance.   Neurological:      Mental Status: He is alert.       Assessment / Plan      Assessment & Plan:  Problem List Items Addressed This Visit       Hypogonadism in male - Primary    Current Assessment & Plan     Clinically stable  Will check testo and cbc             Relevant Medications    Testim 50 MG/5GM (1%) gel gel    Other Relevant Orders    Testosterone    CBC (No Diff)    Special screening for malignant neoplasm of prostate    Relevant Orders    PSA Screen        Electronically signed by : Jair Chan MD   2025    "

## 2025-01-14 ENCOUNTER — PRIOR AUTHORIZATION (OUTPATIENT)
Dept: ENDOCRINOLOGY | Facility: CLINIC | Age: 55
End: 2025-01-14
Payer: COMMERCIAL

## 2025-01-14 DIAGNOSIS — E29.1 HYPOGONADISM IN MALE: ICD-10-CM

## 2025-01-14 LAB
PSA SERPL-MCNC: 0.81 NG/ML (ref 0–4)
TESTOST SERPL-MCNC: 291 NG/DL (ref 193–740)

## 2025-01-14 RX ORDER — TESTOSTERONE GEL, 1% 10 MG/G
50 GEL TRANSDERMAL DAILY
Qty: 1.5 G | Refills: 5 | Status: SHIPPED | OUTPATIENT
Start: 2025-01-14

## 2025-01-14 NOTE — TELEPHONE ENCOUNTER
Raymon Gonzalez (Zelaya: [id number])  PA Case ID #: [id number]  Rx #: [id number]  Need Help? Call us at [phone]  Outcome  Approved today by [employer] 2017  PA Case: [id number], Status: Approved, Coverage Starts on: 1/14/2025 12:00:00 AM, Coverage Ends on: 1/14/2026 12:00:00 AM.  Authorization Expiration Date: 1/13/2026  Drug  Testosterone 50 MG/5GM(1%) gel  ePA cloud logo  Form  [place] [employer] [facility] Electronic PA Form

## 2025-07-18 DIAGNOSIS — E29.1 HYPOGONADISM IN MALE: ICD-10-CM

## 2025-07-18 RX ORDER — TESTOSTERONE GEL, 1% 10 MG/G
GEL TRANSDERMAL
Qty: 150 G | Refills: 5 | Status: SHIPPED | OUTPATIENT
Start: 2025-07-18

## 2025-07-18 NOTE — TELEPHONE ENCOUNTER
Rx Refill Note  Requested Prescriptions     Pending Prescriptions Disp Refills    testosterone (ANDROGEL) 50 MG/5GM (1%) gel gel [Pharmacy Med Name: TESTOSTERONE 1%(50MG) GEL 5GM UDT] 150 g      Sig: PLACE 50 MG ONTO THE SKIN AS DIRECTED EVERY DAY        Last office visit with prescribing clinician: 1/13/2025      Next office visit with prescribing clinician: 1/14/2026       Nichelle Simeon (Jodi)  07/18/25, 12:44 EDT

## 2025-08-05 ENCOUNTER — OFFICE VISIT (OUTPATIENT)
Dept: FAMILY MEDICINE CLINIC | Facility: CLINIC | Age: 55
End: 2025-08-05
Payer: COMMERCIAL

## 2025-08-05 VITALS
OXYGEN SATURATION: 98 % | HEIGHT: 71 IN | TEMPERATURE: 98.3 F | DIASTOLIC BLOOD PRESSURE: 68 MMHG | BODY MASS INDEX: 24.22 KG/M2 | WEIGHT: 173 LBS | SYSTOLIC BLOOD PRESSURE: 138 MMHG | HEART RATE: 96 BPM

## 2025-08-05 DIAGNOSIS — R53.82 CHRONIC FATIGUE: Primary | ICD-10-CM

## 2025-08-05 DIAGNOSIS — M79.644 BILATERAL THUMB PAIN: ICD-10-CM

## 2025-08-05 DIAGNOSIS — M79.645 BILATERAL THUMB PAIN: ICD-10-CM

## 2025-08-05 DIAGNOSIS — J06.9 UPPER RESPIRATORY TRACT INFECTION, UNSPECIFIED TYPE: ICD-10-CM

## 2025-08-05 DIAGNOSIS — Z13.220 SCREENING CHOLESTEROL LEVEL: ICD-10-CM

## 2025-08-05 PROCEDURE — 99214 OFFICE O/P EST MOD 30 MIN: CPT | Performed by: FAMILY MEDICINE

## 2025-08-06 LAB
ALBUMIN SERPL-MCNC: 4.6 G/DL (ref 3.5–5.2)
ALBUMIN/GLOB SERPL: 1.5 G/DL
ALP SERPL-CCNC: 89 U/L (ref 39–117)
ALT SERPL-CCNC: 18 U/L (ref 1–41)
AST SERPL-CCNC: 20 U/L (ref 1–40)
BILIRUB SERPL-MCNC: 0.3 MG/DL (ref 0–1.2)
BUN SERPL-MCNC: 13 MG/DL (ref 6–20)
BUN/CREAT SERPL: 12.1 (ref 7–25)
CALCIUM SERPL-MCNC: 9.7 MG/DL (ref 8.6–10.5)
CHLORIDE SERPL-SCNC: 98 MMOL/L (ref 98–107)
CHOLEST SERPL-MCNC: 206 MG/DL (ref 0–200)
CO2 SERPL-SCNC: 29.1 MMOL/L (ref 22–29)
CREAT SERPL-MCNC: 1.07 MG/DL (ref 0.76–1.27)
EGFRCR SERPLBLD CKD-EPI 2021: 81.4 ML/MIN/1.73
ERYTHROCYTE [DISTWIDTH] IN BLOOD BY AUTOMATED COUNT: 12.6 % (ref 12.3–15.4)
GLOBULIN SER CALC-MCNC: 3.1 GM/DL
GLUCOSE SERPL-MCNC: 85 MG/DL (ref 65–99)
HBA1C MFR BLD: 5.5 % (ref 4.8–5.6)
HCT VFR BLD AUTO: 42.9 % (ref 37.5–51)
HDLC SERPL-MCNC: 45 MG/DL (ref 40–60)
HGB BLD-MCNC: 14.4 G/DL (ref 13–17.7)
LDLC SERPL CALC-MCNC: 132 MG/DL (ref 0–100)
MCH RBC QN AUTO: 30.3 PG (ref 26.6–33)
MCHC RBC AUTO-ENTMCNC: 33.6 G/DL (ref 31.5–35.7)
MCV RBC AUTO: 90.1 FL (ref 79–97)
PLATELET # BLD AUTO: 358 10*3/MM3 (ref 140–450)
POTASSIUM SERPL-SCNC: 4.9 MMOL/L (ref 3.5–5.2)
PROT SERPL-MCNC: 7.7 G/DL (ref 6–8.5)
RBC # BLD AUTO: 4.76 10*6/MM3 (ref 4.14–5.8)
SODIUM SERPL-SCNC: 137 MMOL/L (ref 136–145)
TRIGL SERPL-MCNC: 161 MG/DL (ref 0–150)
TSH SERPL DL<=0.005 MIU/L-ACNC: 0.99 UIU/ML (ref 0.27–4.2)
VLDLC SERPL CALC-MCNC: 29 MG/DL (ref 5–40)
WBC # BLD AUTO: 10.73 10*3/MM3 (ref 3.4–10.8)

## (undated) DEVICE — GOWN ISOL W/THUMB UNIV BLU BX/15

## (undated) DEVICE — THE TORRENT IRRIGATION SCOPE CONNECTOR IS USED WITH THE TORRENT IRRIGATION TUBING TO PROVIDE IRRIGATION FLUIDS SUCH AS STERILE WATER DURING GASTROINTESTINAL ENDOSCOPIC PROCEDURES WHEN USED IN CONJUNCTION WITH AN IRRIGATION PUMP (OR ELECTROSURGICAL UNIT).: Brand: TORRENT

## (undated) DEVICE — ADAPT CLN BIOGUARD AIR/H2O DISP

## (undated) DEVICE — ADAPT CLN SCPE ENDO PORPOISE BX/50 DISP

## (undated) DEVICE — SNAR POLYP SENSATION STDOVL 27 240 BX40

## (undated) DEVICE — LN SMPL CO2 SHTRM SD STREAM W/M LUER

## (undated) DEVICE — KT ORCA ORCAPOD DISP STRL

## (undated) DEVICE — SENSR O2 OXIMAX FNGR A/ 18IN NONSTR

## (undated) DEVICE — PAD GRND REM POLYHESIVE A/ DISP

## (undated) DEVICE — SNAR POLYP CAPTIVATOR2 RND STFF 2.4 10MM 240CM

## (undated) DEVICE — GOWN SURG ENDOARMOR LVL3 UNIV KNT/CUF DISP NS

## (undated) DEVICE — CANN O2 ETCO2 FITS ALL CONN CO2 SMPL A/ 7IN DISP LF

## (undated) DEVICE — SNAR POLYP CAPTIVATOR2 RND STFF 2.4 25MM 240CM

## (undated) DEVICE — THE SINGLE USE ETRAP – POLYP TRAP IS USED FOR SUCTION RETRIEVAL OF ENDOSCOPICALLY REMOVED POLYPS.: Brand: ETRAP

## (undated) DEVICE — FLEX ADVANTAGE 1500CC: Brand: FLEX ADVANTAGE

## (undated) DEVICE — GAUZE,SPONGE,FLUFF,6"X6.75",STRL,5/TRAY: Brand: MEDLINE

## (undated) DEVICE — TUBING, SUCTION, 1/4" X 10', STRAIGHT: Brand: MEDLINE

## (undated) DEVICE — SINGLE-USE BIOPSY FORCEPS: Brand: RADIAL JAW 4

## (undated) DEVICE — Device